# Patient Record
Sex: FEMALE | Race: WHITE | Employment: OTHER | URBAN - METROPOLITAN AREA
[De-identification: names, ages, dates, MRNs, and addresses within clinical notes are randomized per-mention and may not be internally consistent; named-entity substitution may affect disease eponyms.]

---

## 2022-05-30 ENCOUNTER — APPOINTMENT (OUTPATIENT)
Dept: CT IMAGING | Age: 87
DRG: 480 | End: 2022-05-30
Payer: MEDICARE

## 2022-05-30 ENCOUNTER — APPOINTMENT (OUTPATIENT)
Dept: GENERAL RADIOLOGY | Age: 87
DRG: 480 | End: 2022-05-30
Payer: MEDICARE

## 2022-05-30 ENCOUNTER — HOSPITAL ENCOUNTER (INPATIENT)
Age: 87
LOS: 4 days | Discharge: HOME HEALTH | DRG: 480 | End: 2022-06-03
Attending: EMERGENCY MEDICINE
Payer: MEDICARE

## 2022-05-30 DIAGNOSIS — S72.002A CLOSED FRACTURE OF LEFT HIP, INITIAL ENCOUNTER (HCC): Primary | ICD-10-CM

## 2022-05-30 DIAGNOSIS — R91.8 PULMONARY INFILTRATE: ICD-10-CM

## 2022-05-30 PROBLEM — E78.5 HYPERLIPIDEMIA: Chronic | Status: ACTIVE | Noted: 2022-05-30

## 2022-05-30 PROBLEM — E11.9 TYPE 2 DIABETES MELLITUS (HCC): Chronic | Status: ACTIVE | Noted: 2022-05-30

## 2022-05-30 PROBLEM — J84.10 PULMONARY FIBROSIS (HCC): Chronic | Status: ACTIVE | Noted: 2022-05-30

## 2022-05-30 PROBLEM — D72.829 LEUKOCYTOSIS: Status: ACTIVE | Noted: 2022-05-30

## 2022-05-30 PROBLEM — M54.9 CHRONIC BACK PAIN: Chronic | Status: ACTIVE | Noted: 2022-05-30

## 2022-05-30 PROBLEM — I10 HYPERTENSION: Chronic | Status: ACTIVE | Noted: 2022-05-30

## 2022-05-30 PROBLEM — G89.29 CHRONIC BACK PAIN: Chronic | Status: ACTIVE | Noted: 2022-05-30

## 2022-05-30 PROBLEM — S72.22XA CLOSED LEFT SUBTROCHANTERIC FEMUR FRACTURE, INITIAL ENCOUNTER (HCC): Status: ACTIVE | Noted: 2022-05-30

## 2022-05-30 PROBLEM — J96.11 CHRONIC RESPIRATORY FAILURE WITH HYPOXIA (HCC): Chronic | Status: ACTIVE | Noted: 2022-05-30

## 2022-05-30 PROBLEM — F11.20 OPIOID DEPENDENCE (HCC): Chronic | Status: ACTIVE | Noted: 2022-05-30

## 2022-05-30 LAB
ALBUMIN SERPL-MCNC: 3.1 G/DL (ref 3.2–4.6)
ALBUMIN/GLOB SERPL: 0.8 {RATIO} (ref 1.2–3.5)
ALP SERPL-CCNC: 90 U/L (ref 50–136)
ALT SERPL-CCNC: 17 U/L (ref 12–65)
ANION GAP SERPL CALC-SCNC: 3 MMOL/L (ref 7–16)
APPEARANCE UR: CLEAR
AST SERPL-CCNC: 18 U/L (ref 15–37)
BASOPHILS # BLD: 0 K/UL (ref 0–0.2)
BASOPHILS NFR BLD: 0 % (ref 0–2)
BILIRUB SERPL-MCNC: 0.3 MG/DL (ref 0.2–1.1)
BILIRUB UR QL: NEGATIVE
BUN SERPL-MCNC: 14 MG/DL (ref 8–23)
CALCIUM SERPL-MCNC: 9.2 MG/DL (ref 8.3–10.4)
CHLORIDE SERPL-SCNC: 102 MMOL/L (ref 98–107)
CO2 SERPL-SCNC: 33 MMOL/L (ref 21–32)
COLOR UR: YELLOW
CREAT SERPL-MCNC: 1 MG/DL (ref 0.6–1)
DIFFERENTIAL METHOD BLD: ABNORMAL
EOSINOPHIL # BLD: 0.2 K/UL (ref 0–0.8)
EOSINOPHIL NFR BLD: 1 % (ref 0.5–7.8)
ERYTHROCYTE [DISTWIDTH] IN BLOOD BY AUTOMATED COUNT: 12.4 % (ref 11.9–14.6)
GLOBULIN SER CALC-MCNC: 4.1 G/DL (ref 2.3–3.5)
GLUCOSE BLD STRIP.AUTO-MCNC: 165 MG/DL (ref 65–100)
GLUCOSE SERPL-MCNC: 186 MG/DL (ref 65–100)
GLUCOSE UR STRIP.AUTO-MCNC: NEGATIVE MG/DL
HCT VFR BLD AUTO: 34.6 % (ref 35.8–46.3)
HGB BLD-MCNC: 11.1 G/DL (ref 11.7–15.4)
HGB UR QL STRIP: NEGATIVE
IMM GRANULOCYTES # BLD AUTO: 0.1 K/UL (ref 0–0.5)
IMM GRANULOCYTES NFR BLD AUTO: 0 % (ref 0–5)
KETONES UR QL STRIP.AUTO: NEGATIVE MG/DL
LEUKOCYTE ESTERASE UR QL STRIP.AUTO: NEGATIVE
LYMPHOCYTES # BLD: 1.2 K/UL (ref 0.5–4.6)
LYMPHOCYTES NFR BLD: 8 % (ref 13–44)
MCH RBC QN AUTO: 29.1 PG (ref 26.1–32.9)
MCHC RBC AUTO-ENTMCNC: 32.1 G/DL (ref 31.4–35)
MCV RBC AUTO: 90.8 FL (ref 79.6–97.8)
MONOCYTES # BLD: 0.7 K/UL (ref 0.1–1.3)
MONOCYTES NFR BLD: 5 % (ref 4–12)
NEUTS SEG # BLD: 11.9 K/UL (ref 1.7–8.2)
NEUTS SEG NFR BLD: 86 % (ref 43–78)
NITRITE UR QL STRIP.AUTO: NEGATIVE
NRBC # BLD: 0 K/UL (ref 0–0.2)
PH UR STRIP: 6.5 [PH] (ref 5–9)
PLATELET # BLD AUTO: 285 K/UL (ref 150–450)
PMV BLD AUTO: 11.4 FL (ref 9.4–12.3)
POTASSIUM SERPL-SCNC: 4.3 MMOL/L (ref 3.5–5.1)
PROT SERPL-MCNC: 7.2 G/DL (ref 6.3–8.2)
PROT UR STRIP-MCNC: NEGATIVE MG/DL
RBC # BLD AUTO: 3.81 M/UL (ref 4.05–5.2)
SERVICE CMNT-IMP: ABNORMAL
SODIUM SERPL-SCNC: 138 MMOL/L (ref 136–145)
SP GR UR REFRACTOMETRY: 1.01 (ref 1–1.02)
UROBILINOGEN UR QL STRIP.AUTO: 0.2 EU/DL (ref 0.2–1)
WBC # BLD AUTO: 14 K/UL (ref 4.3–11.1)

## 2022-05-30 PROCEDURE — 96374 THER/PROPH/DIAG INJ IV PUSH: CPT

## 2022-05-30 PROCEDURE — 73552 X-RAY EXAM OF FEMUR 2/>: CPT

## 2022-05-30 PROCEDURE — 99285 EMERGENCY DEPT VISIT HI MDM: CPT

## 2022-05-30 PROCEDURE — 81003 URINALYSIS AUTO W/O SCOPE: CPT

## 2022-05-30 PROCEDURE — 2700000000 HC OXYGEN THERAPY PER DAY

## 2022-05-30 PROCEDURE — 72100 X-RAY EXAM L-S SPINE 2/3 VWS: CPT

## 2022-05-30 PROCEDURE — 82962 GLUCOSE BLOOD TEST: CPT

## 2022-05-30 PROCEDURE — 73502 X-RAY EXAM HIP UNI 2-3 VIEWS: CPT

## 2022-05-30 PROCEDURE — 94760 N-INVAS EAR/PLS OXIMETRY 1: CPT

## 2022-05-30 PROCEDURE — 1100000000 HC RM PRIVATE

## 2022-05-30 PROCEDURE — 93005 ELECTROCARDIOGRAM TRACING: CPT | Performed by: EMERGENCY MEDICINE

## 2022-05-30 PROCEDURE — 2580000003 HC RX 258: Performed by: EMERGENCY MEDICINE

## 2022-05-30 PROCEDURE — 6360000002 HC RX W HCPCS: Performed by: EMERGENCY MEDICINE

## 2022-05-30 PROCEDURE — 73700 CT LOWER EXTREMITY W/O DYE: CPT

## 2022-05-30 PROCEDURE — 70450 CT HEAD/BRAIN W/O DYE: CPT

## 2022-05-30 PROCEDURE — 71045 X-RAY EXAM CHEST 1 VIEW: CPT

## 2022-05-30 PROCEDURE — 85025 COMPLETE CBC W/AUTO DIFF WBC: CPT

## 2022-05-30 PROCEDURE — 96375 TX/PRO/DX INJ NEW DRUG ADDON: CPT

## 2022-05-30 PROCEDURE — 2580000003 HC RX 258: Performed by: INTERNAL MEDICINE

## 2022-05-30 PROCEDURE — 80053 COMPREHEN METABOLIC PANEL: CPT

## 2022-05-30 PROCEDURE — 72125 CT NECK SPINE W/O DYE: CPT

## 2022-05-30 PROCEDURE — 6370000000 HC RX 637 (ALT 250 FOR IP): Performed by: INTERNAL MEDICINE

## 2022-05-30 PROCEDURE — 6360000002 HC RX W HCPCS: Performed by: INTERNAL MEDICINE

## 2022-05-30 RX ORDER — FERROUS SULFATE 325(65) MG
325 TABLET ORAL
Status: DISCONTINUED | OUTPATIENT
Start: 2022-05-31 | End: 2022-06-03 | Stop reason: HOSPADM

## 2022-05-30 RX ORDER — CARVEDILOL 12.5 MG/1
12.5 TABLET ORAL 2 TIMES DAILY WITH MEALS
Status: DISCONTINUED | OUTPATIENT
Start: 2022-05-30 | End: 2022-06-03 | Stop reason: HOSPADM

## 2022-05-30 RX ORDER — HYDROMORPHONE HYDROCHLORIDE 1 MG/ML
0.5 INJECTION, SOLUTION INTRAMUSCULAR; INTRAVENOUS; SUBCUTANEOUS EVERY 4 HOURS PRN
Status: DISCONTINUED | OUTPATIENT
Start: 2022-05-30 | End: 2022-05-30

## 2022-05-30 RX ORDER — INSULIN LISPRO 100 [IU]/ML
0-4 INJECTION, SOLUTION INTRAVENOUS; SUBCUTANEOUS
Status: DISCONTINUED | OUTPATIENT
Start: 2022-05-31 | End: 2022-06-03 | Stop reason: HOSPADM

## 2022-05-30 RX ORDER — ONDANSETRON 2 MG/ML
4 INJECTION INTRAMUSCULAR; INTRAVENOUS EVERY 6 HOURS PRN
Status: DISCONTINUED | OUTPATIENT
Start: 2022-05-30 | End: 2022-06-01

## 2022-05-30 RX ORDER — SODIUM CHLORIDE 0.9 % (FLUSH) 0.9 %
5-40 SYRINGE (ML) INJECTION EVERY 12 HOURS SCHEDULED
Status: DISCONTINUED | OUTPATIENT
Start: 2022-05-30 | End: 2022-06-01

## 2022-05-30 RX ORDER — MORPHINE SULFATE 4 MG/ML
4 INJECTION INTRAVENOUS
Status: COMPLETED | OUTPATIENT
Start: 2022-05-30 | End: 2022-05-30

## 2022-05-30 RX ORDER — TRAZODONE HYDROCHLORIDE 50 MG/1
150 TABLET ORAL NIGHTLY
Status: DISCONTINUED | OUTPATIENT
Start: 2022-05-30 | End: 2022-06-03 | Stop reason: HOSPADM

## 2022-05-30 RX ORDER — ALBUTEROL SULFATE 2.5 MG/3ML
2.5 SOLUTION RESPIRATORY (INHALATION) EVERY 6 HOURS PRN
Status: DISCONTINUED | OUTPATIENT
Start: 2022-05-30 | End: 2022-06-03 | Stop reason: HOSPADM

## 2022-05-30 RX ORDER — HYDROMORPHONE HYDROCHLORIDE 1 MG/ML
0.5 INJECTION, SOLUTION INTRAMUSCULAR; INTRAVENOUS; SUBCUTANEOUS EVERY 4 HOURS PRN
Status: DISCONTINUED | OUTPATIENT
Start: 2022-05-30 | End: 2022-05-31

## 2022-05-30 RX ORDER — ATORVASTATIN CALCIUM 10 MG/1
20 TABLET, FILM COATED ORAL NIGHTLY
Status: DISCONTINUED | OUTPATIENT
Start: 2022-05-30 | End: 2022-06-03 | Stop reason: HOSPADM

## 2022-05-30 RX ORDER — ONDANSETRON 2 MG/ML
4 INJECTION INTRAMUSCULAR; INTRAVENOUS
Status: COMPLETED | OUTPATIENT
Start: 2022-05-30 | End: 2022-05-30

## 2022-05-30 RX ORDER — OXYCODONE HYDROCHLORIDE 5 MG/1
5 TABLET ORAL EVERY 4 HOURS PRN
Status: DISCONTINUED | OUTPATIENT
Start: 2022-05-30 | End: 2022-06-01

## 2022-05-30 RX ORDER — GABAPENTIN 100 MG/1
100 CAPSULE ORAL 3 TIMES DAILY
Status: DISCONTINUED | OUTPATIENT
Start: 2022-05-30 | End: 2022-06-03 | Stop reason: HOSPADM

## 2022-05-30 RX ORDER — INSULIN LISPRO 100 [IU]/ML
0-4 INJECTION, SOLUTION INTRAVENOUS; SUBCUTANEOUS NIGHTLY
Status: DISCONTINUED | OUTPATIENT
Start: 2022-05-30 | End: 2022-06-03 | Stop reason: HOSPADM

## 2022-05-30 RX ORDER — ONDANSETRON 4 MG/1
4 TABLET, ORALLY DISINTEGRATING ORAL EVERY 8 HOURS PRN
Status: DISCONTINUED | OUTPATIENT
Start: 2022-05-30 | End: 2022-06-01

## 2022-05-30 RX ORDER — PANTOPRAZOLE SODIUM 40 MG/1
40 TABLET, DELAYED RELEASE ORAL
Status: DISCONTINUED | OUTPATIENT
Start: 2022-05-31 | End: 2022-06-03 | Stop reason: HOSPADM

## 2022-05-30 RX ORDER — SODIUM CHLORIDE 0.9 % (FLUSH) 0.9 %
5-40 SYRINGE (ML) INJECTION PRN
Status: DISCONTINUED | OUTPATIENT
Start: 2022-05-30 | End: 2022-06-01

## 2022-05-30 RX ORDER — HYDRALAZINE HYDROCHLORIDE 20 MG/ML
10 INJECTION INTRAMUSCULAR; INTRAVENOUS EVERY 6 HOURS PRN
Status: DISCONTINUED | OUTPATIENT
Start: 2022-05-30 | End: 2022-06-03 | Stop reason: HOSPADM

## 2022-05-30 RX ORDER — FUROSEMIDE 40 MG/1
40 TABLET ORAL 2 TIMES DAILY
Status: DISCONTINUED | OUTPATIENT
Start: 2022-05-30 | End: 2022-06-03 | Stop reason: HOSPADM

## 2022-05-30 RX ORDER — ACETAMINOPHEN 325 MG/1
650 TABLET ORAL EVERY 6 HOURS PRN
Status: DISCONTINUED | OUTPATIENT
Start: 2022-05-30 | End: 2022-06-03 | Stop reason: HOSPADM

## 2022-05-30 RX ORDER — TOPIRAMATE 25 MG/1
25 TABLET ORAL 2 TIMES DAILY
Status: DISCONTINUED | OUTPATIENT
Start: 2022-05-30 | End: 2022-06-03 | Stop reason: HOSPADM

## 2022-05-30 RX ORDER — SODIUM CHLORIDE 9 MG/ML
INJECTION, SOLUTION INTRAVENOUS PRN
Status: DISCONTINUED | OUTPATIENT
Start: 2022-05-30 | End: 2022-06-01

## 2022-05-30 RX ORDER — PIRFENIDONE 267 MG/1
801 TABLET, FILM COATED ORAL 3 TIMES DAILY
Status: DISCONTINUED | OUTPATIENT
Start: 2022-05-31 | End: 2022-06-03 | Stop reason: HOSPADM

## 2022-05-30 RX ORDER — 0.9 % SODIUM CHLORIDE 0.9 %
500 INTRAVENOUS SOLUTION INTRAVENOUS
Status: COMPLETED | OUTPATIENT
Start: 2022-05-30 | End: 2022-05-30

## 2022-05-30 RX ORDER — POLYETHYLENE GLYCOL 3350 17 G/17G
17 POWDER, FOR SOLUTION ORAL DAILY PRN
Status: DISCONTINUED | OUTPATIENT
Start: 2022-05-30 | End: 2022-06-03 | Stop reason: HOSPADM

## 2022-05-30 RX ORDER — LOSARTAN POTASSIUM 50 MG/1
50 TABLET ORAL DAILY
Status: DISCONTINUED | OUTPATIENT
Start: 2022-05-31 | End: 2022-06-03 | Stop reason: HOSPADM

## 2022-05-30 RX ORDER — DIAZEPAM 2 MG/1
2 TABLET ORAL EVERY 12 HOURS PRN
Status: DISCONTINUED | OUTPATIENT
Start: 2022-05-30 | End: 2022-06-03 | Stop reason: HOSPADM

## 2022-05-30 RX ORDER — ASPIRIN 81 MG/1
81 TABLET, CHEWABLE ORAL DAILY
Status: DISCONTINUED | OUTPATIENT
Start: 2022-05-31 | End: 2022-06-01 | Stop reason: SDUPTHER

## 2022-05-30 RX ADMIN — FUROSEMIDE 40 MG: 40 TABLET ORAL at 21:52

## 2022-05-30 RX ADMIN — AZITHROMYCIN MONOHYDRATE 500 MG: 500 INJECTION, POWDER, LYOPHILIZED, FOR SOLUTION INTRAVENOUS at 21:46

## 2022-05-30 RX ADMIN — GABAPENTIN 100 MG: 100 CAPSULE ORAL at 21:52

## 2022-05-30 RX ADMIN — ATORVASTATIN CALCIUM 20 MG: 10 TABLET, FILM COATED ORAL at 21:51

## 2022-05-30 RX ADMIN — TOPIRAMATE 25 MG: 25 TABLET, FILM COATED ORAL at 21:52

## 2022-05-30 RX ADMIN — SODIUM CHLORIDE, PRESERVATIVE FREE 10 ML: 5 INJECTION INTRAVENOUS at 21:08

## 2022-05-30 RX ADMIN — CARVEDILOL 12.5 MG: 12.5 TABLET, FILM COATED ORAL at 21:52

## 2022-05-30 RX ADMIN — CEFTRIAXONE 2000 MG: 2 INJECTION, POWDER, FOR SOLUTION INTRAMUSCULAR; INTRAVENOUS at 21:13

## 2022-05-30 RX ADMIN — MORPHINE SULFATE 4 MG: 4 INJECTION INTRAVENOUS at 16:14

## 2022-05-30 RX ADMIN — TRAZODONE HYDROCHLORIDE 150 MG: 50 TABLET ORAL at 21:52

## 2022-05-30 RX ADMIN — ONDANSETRON 4 MG: 2 INJECTION INTRAMUSCULAR; INTRAVENOUS at 16:14

## 2022-05-30 RX ADMIN — HYDROMORPHONE HYDROCHLORIDE 0.5 MG: 1 INJECTION, SOLUTION INTRAMUSCULAR; INTRAVENOUS; SUBCUTANEOUS at 21:07

## 2022-05-30 RX ADMIN — SODIUM CHLORIDE 500 ML: 9 INJECTION, SOLUTION INTRAVENOUS at 16:14

## 2022-05-30 ASSESSMENT — PAIN DESCRIPTION - ORIENTATION
ORIENTATION: LEFT
ORIENTATION: LOWER

## 2022-05-30 ASSESSMENT — ENCOUNTER SYMPTOMS
ABDOMINAL PAIN: 0
BACK PAIN: 0
VOMITING: 0
SHORTNESS OF BREATH: 0
COUGH: 0

## 2022-05-30 ASSESSMENT — PAIN SCALES - GENERAL
PAINLEVEL_OUTOF10: 7
PAINLEVEL_OUTOF10: 6
PAINLEVEL_OUTOF10: 10
PAINLEVEL_OUTOF10: 10

## 2022-05-30 ASSESSMENT — PAIN - FUNCTIONAL ASSESSMENT
PAIN_FUNCTIONAL_ASSESSMENT: INTOLERABLE, UNABLE TO DO ANY ACTIVE OR PASSIVE ACTIVITIES
PAIN_FUNCTIONAL_ASSESSMENT: 0-10

## 2022-05-30 ASSESSMENT — PAIN SCALES - WONG BAKER: WONGBAKER_NUMERICALRESPONSE: 0

## 2022-05-30 ASSESSMENT — PAIN DESCRIPTION - LOCATION
LOCATION: BACK;HIP
LOCATION: HIP
LOCATION: BACK

## 2022-05-30 ASSESSMENT — PAIN DESCRIPTION - DESCRIPTORS
DESCRIPTORS: ACHING
DESCRIPTORS: ACHING

## 2022-05-30 NOTE — ED TRIAGE NOTES
Patient arrives to ED via EMS from home. Patient reports falling last night. Patient reports she does not know what caused her to fall. Patient reports, \"I just fell\". Denies hitting head. Denies LOC. Patient reports falling onto her left side. Patient hit left elbow and left hip. Patient also complains of lower back pain. Denies blood thinners. Patient wears 4L of oxygen all the time. Patient has history of Cystic Fibrosis.      VS:  BP-116/67  HR-91  BGL-130  O2-95% on 4L

## 2022-05-30 NOTE — ED PROVIDER NOTES
[employer] Emergency Department Provider Note                   PCP:                Sandy Saldivar MD               Age: 81 y.o. Sex: female     No diagnosis found. DISPOSITION         New Prescriptions    No medications on file       Orders Placed This Encounter   Procedures    CT HEAD WO CONTRAST    CT CERVICAL SPINE WO CONTRAST    XR LUMBAR SPINE (2-3 VIEWS)    XR HIP 2-3 VW W PELVIS LEFT    XR FEMUR LEFT (MIN 2 VIEWS)    XR CHEST 1 VIEW    CT HIP LEFT WO CONTRAST    CBC with Auto Differential    Comprehensive Metabolic Panel    Urinalysis    Insert carreno catheter    EKG 12 Lead        MDM  Number of Diagnoses or Management Options  Closed fracture of left hip, initial encounter ([facility] [address]  Pulmonary infiltrate  Diagnosis management comments: Blood work shows mild leukocytosis 14.0, hemoglobin 11.1 hematocrit 34.6. Rest of labs unremarkable. X-ray of L-spine, left hip and femur no acute fracture. CT head and C-spine negative for acute abnormality. Patient's exam concerning for hip fracture therefore CT scan was obtained following the negative x-ray reading. This confirms proximal femoral neck fracture. Patient has been treated with morphine and Zofran. Hospitalist consulted for admission. Amount and/or Complexity of Data Reviewed  Clinical lab tests: ordered and reviewed  Tests in the radiology section of CPT®: ordered and reviewed  Tests in the medicine section of CPT®: reviewed and ordered  Independent visualization of images, tracings, or specimens: yes    Risk of Complications, Morbidity, and/or Mortality  Presenting problems: moderate  Diagnostic procedures: moderate  Management options: moderate         Leyda July is a 81 y.o. female who presents to the Emergency Department with chief complaint of    Chief Complaint   Patient presents with    Fall      45-year-old white female presents after falling during the night last night. She fell onto her left side. No loss of consciousness.   No neck pain or back pain. She is only complaining of pain to her left hip and elbow.  was able to assist her back to bed but this morning she is unable to put any weight on her left leg. The history is provided by the patient and the spouse. All other systems reviewed and are negative. Review of Systems   Constitutional:  Negative for fever. HENT:  Negative for congestion. Respiratory:  Negative for cough and shortness of breath. Cardiovascular:  Negative for chest pain. Gastrointestinal:  Negative for abdominal pain and vomiting. Genitourinary:  Negative for dysuria. Musculoskeletal:  Negative for back pain and neck pain. Skin:  Negative for rash. Neurological:  Negative for headaches. All other systems reviewed and are negative. No past medical history on file. No past surgical history on file. No family history on file. Social Connections:     Frequency of Communication with Friends and Family: Not on file    Frequency of Social Gatherings with Friends and Family: Not on file    Attends Hoahaoism Services: Not on file    Active Member of Clubs or Organizations: Not on file    Attends Club or Organization Meetings: Not on file    Marital Status: Not on file        Allergies   Allergen Reactions    Carbamazepine     Metronidazole Nausea Only     Other reaction(s): Nausea    Prednisone      Other reaction(s): Rash    Tetanus Toxoid      Other reaction(s): Rash        Vitals signs and nursing note reviewed. Patient Vitals for the past 4 hrs:   Temp Pulse Resp BP SpO2   05/30/22 1500 99.5 °F (37.5 °C) 91 16 (!) 150/70 95 %          Physical Exam  Vitals and nursing note reviewed. Constitutional:       General: She is not in acute distress. Appearance: Normal appearance. She is not toxic-appearing. HENT:      Head: Normocephalic and atraumatic. Nose: Nose normal.      Mouth/Throat:      Mouth: Mucous membranes are dry.    Eyes:      Pupils: Pupils are equal, round, and reactive to light. Cardiovascular:      Rate and Rhythm: Normal rate and regular rhythm. Pulmonary:      Effort: Pulmonary effort is normal.      Breath sounds: Normal breath sounds. Abdominal:      General: There is no distension. Tenderness: There is no abdominal tenderness. There is no guarding. Musculoskeletal:      Cervical back: Normal range of motion. No tenderness. Comments: Patient has no range of motion to the left hip due to severe pain. No shortening or abnormal rotation. Good distal sensation. Skin:     General: Skin is warm and dry. Neurological:      Mental Status: She is alert and oriented to person, place, and time.    Psychiatric:         Mood and Affect: Mood normal.         Behavior: Behavior normal.        EKG 12 Lead    Date/Time: 10/13/2022 11:18 PM  Performed by: Christopher Smith MD  Authorized by: Christopher Smith MD     ECG reviewed by ED Physician in the absence of a cardiologist: yes    Interpretation:     Interpretation: abnormal    Rate:     ECG rate:  90    ECG rate assessment: normal    Rhythm:     Rhythm: sinus rhythm    Ectopy:     Ectopy: none    QRS:     QRS axis:  Normal  ST segments:     ST segments:  Normal  T waves:     T waves: non-specific    Other findings:     Other findings: prolonged qTc interval      Labs Reviewed   CBC WITH AUTO DIFFERENTIAL - Abnormal; Notable for the following components:       Result Value    WBC 14.0 (*)     RBC 3.81 (*)     Hemoglobin 11.1 (*)     Hematocrit 34.6 (*)     Seg Neutrophils 86 (*)     Lymphocytes 8 (*)     Segs Absolute 11.9 (*)     All other components within normal limits   COMPREHENSIVE METABOLIC PANEL - Abnormal; Notable for the following components:    CO2 33 (*)     Anion Gap 3 (*)     Glucose 186 (*)     GFR Non- 56 (*)     Albumin 3.1 (*)     Globulin 4.1 (*)     Albumin/Globulin Ratio 0.8 (*)     All other components within normal limits   URINALYSIS        XR LUMBAR SPINE (2-3 VIEWS)   Final Result   No acute fracture. Scoliosis, degenerative changes, low bone density noted. XR HIP 2-3 VW W PELVIS LEFT   Final Result      1. No evidence of a displaced fracture or dislocation. 2. Low bone density, and left hip rotation limiting some detail. Cannot exclude   nondisplaced proximal femur fracture. XR FEMUR LEFT (MIN 2 VIEWS)   Final Result      1. No evidence of a displaced fracture or dislocation. 2. Low bone density, and left hip rotation limiting some detail. Cannot exclude   nondisplaced proximal femur fracture. XR CHEST 1 VIEW   Final Result   1. Left basilar pneumonia. 2. Cardiac enlargement and pulmonary edema suggested. CT HEAD WO CONTRAST   Final Result   No CT evidence of acute intracranial abnormality. CT CERVICAL SPINE WO CONTRAST   Final Result   No acute osseous abnormality. CT HIP LEFT WO CONTRAST    (Results Pending)            [place] Coma Scale  Eye Opening: Spontaneous  Best Verbal Response: Confused  Best Motor Response: Obeys commands  [place] Coma Scale Score: 14                     Voice dictation software was used during the making of this note. This software is not perfect and grammatical and other typographical errors may be present. This note has not been completely proofread for errors.       Pattie Naylor MD  05/30/22 1975 Eusebio Naylor MD  10/13/22 [id number]       Pattie Naylor MD  10/13/22 [id number]

## 2022-05-31 ENCOUNTER — ANESTHESIA EVENT (OUTPATIENT)
Dept: SURGERY | Age: 87
DRG: 480 | End: 2022-05-31
Payer: MEDICARE

## 2022-05-31 PROBLEM — S72.002A CLOSED LEFT HIP FRACTURE (HCC): Status: ACTIVE | Noted: 2022-05-30

## 2022-05-31 LAB
ANION GAP SERPL CALC-SCNC: 4 MMOL/L (ref 7–16)
BASOPHILS # BLD: 0 K/UL (ref 0–0.2)
BASOPHILS NFR BLD: 0 % (ref 0–2)
BUN SERPL-MCNC: 15 MG/DL (ref 8–23)
CALCIUM SERPL-MCNC: 9 MG/DL (ref 8.3–10.4)
CHLORIDE SERPL-SCNC: 103 MMOL/L (ref 98–107)
CO2 SERPL-SCNC: 33 MMOL/L (ref 21–32)
CREAT SERPL-MCNC: 0.9 MG/DL (ref 0.6–1)
DIFFERENTIAL METHOD BLD: ABNORMAL
EOSINOPHIL # BLD: 0.2 K/UL (ref 0–0.8)
EOSINOPHIL NFR BLD: 2 % (ref 0.5–7.8)
ERYTHROCYTE [DISTWIDTH] IN BLOOD BY AUTOMATED COUNT: 12.6 % (ref 11.9–14.6)
GLUCOSE BLD STRIP.AUTO-MCNC: 172 MG/DL (ref 65–100)
GLUCOSE BLD STRIP.AUTO-MCNC: 178 MG/DL (ref 65–100)
GLUCOSE BLD STRIP.AUTO-MCNC: 188 MG/DL (ref 65–100)
GLUCOSE BLD STRIP.AUTO-MCNC: 193 MG/DL (ref 65–100)
GLUCOSE SERPL-MCNC: 168 MG/DL (ref 65–100)
HCT VFR BLD AUTO: 31.5 % (ref 35.8–46.3)
HGB BLD-MCNC: 10.1 G/DL (ref 11.7–15.4)
IMM GRANULOCYTES # BLD AUTO: 0.1 K/UL (ref 0–0.5)
IMM GRANULOCYTES NFR BLD AUTO: 1 % (ref 0–5)
LYMPHOCYTES # BLD: 1 K/UL (ref 0.5–4.6)
LYMPHOCYTES NFR BLD: 10 % (ref 13–44)
MCH RBC QN AUTO: 29.2 PG (ref 26.1–32.9)
MCHC RBC AUTO-ENTMCNC: 32.1 G/DL (ref 31.4–35)
MCV RBC AUTO: 91 FL (ref 79.6–97.8)
MONOCYTES # BLD: 0.6 K/UL (ref 0.1–1.3)
MONOCYTES NFR BLD: 6 % (ref 4–12)
NEUTS SEG # BLD: 8.5 K/UL (ref 1.7–8.2)
NEUTS SEG NFR BLD: 81 % (ref 43–78)
NRBC # BLD: 0 K/UL (ref 0–0.2)
PLATELET # BLD AUTO: 237 K/UL (ref 150–450)
PMV BLD AUTO: 11.2 FL (ref 9.4–12.3)
POTASSIUM SERPL-SCNC: 3.6 MMOL/L (ref 3.5–5.1)
RBC # BLD AUTO: 3.46 M/UL (ref 4.05–5.2)
SERVICE CMNT-IMP: ABNORMAL
SODIUM SERPL-SCNC: 140 MMOL/L (ref 136–145)
WBC # BLD AUTO: 10.4 K/UL (ref 4.3–11.1)

## 2022-05-31 PROCEDURE — 92610 EVALUATE SWALLOWING FUNCTION: CPT

## 2022-05-31 PROCEDURE — 6370000000 HC RX 637 (ALT 250 FOR IP): Performed by: INTERNAL MEDICINE

## 2022-05-31 PROCEDURE — 6360000002 HC RX W HCPCS: Performed by: NURSE PRACTITIONER

## 2022-05-31 PROCEDURE — 36415 COLL VENOUS BLD VENIPUNCTURE: CPT

## 2022-05-31 PROCEDURE — 1100000000 HC RM PRIVATE

## 2022-05-31 PROCEDURE — 94760 N-INVAS EAR/PLS OXIMETRY 1: CPT

## 2022-05-31 PROCEDURE — 2580000003 HC RX 258: Performed by: INTERNAL MEDICINE

## 2022-05-31 PROCEDURE — 94664 DEMO&/EVAL PT USE INHALER: CPT

## 2022-05-31 PROCEDURE — 80048 BASIC METABOLIC PNL TOTAL CA: CPT

## 2022-05-31 PROCEDURE — 94640 AIRWAY INHALATION TREATMENT: CPT

## 2022-05-31 PROCEDURE — 85025 COMPLETE CBC W/AUTO DIFF WBC: CPT

## 2022-05-31 PROCEDURE — 2500000003 HC RX 250 WO HCPCS: Performed by: INTERNAL MEDICINE

## 2022-05-31 PROCEDURE — 6360000002 HC RX W HCPCS: Performed by: INTERNAL MEDICINE

## 2022-05-31 PROCEDURE — 2700000000 HC OXYGEN THERAPY PER DAY

## 2022-05-31 PROCEDURE — 82962 GLUCOSE BLOOD TEST: CPT

## 2022-05-31 RX ORDER — HYDROMORPHONE HYDROCHLORIDE 1 MG/ML
0.5 INJECTION, SOLUTION INTRAMUSCULAR; INTRAVENOUS; SUBCUTANEOUS ONCE
Status: COMPLETED | OUTPATIENT
Start: 2022-05-31 | End: 2022-05-31

## 2022-05-31 RX ORDER — HYDROMORPHONE HYDROCHLORIDE 1 MG/ML
1 INJECTION, SOLUTION INTRAMUSCULAR; INTRAVENOUS; SUBCUTANEOUS EVERY 4 HOURS PRN
Status: DISCONTINUED | OUTPATIENT
Start: 2022-05-31 | End: 2022-06-01

## 2022-05-31 RX ADMIN — GABAPENTIN 100 MG: 100 CAPSULE ORAL at 15:40

## 2022-05-31 RX ADMIN — SODIUM CHLORIDE, PRESERVATIVE FREE 10 ML: 5 INJECTION INTRAVENOUS at 20:57

## 2022-05-31 RX ADMIN — ATORVASTATIN CALCIUM 20 MG: 10 TABLET, FILM COATED ORAL at 20:57

## 2022-05-31 RX ADMIN — AZITHROMYCIN MONOHYDRATE 500 MG: 500 INJECTION, POWDER, LYOPHILIZED, FOR SOLUTION INTRAVENOUS at 21:32

## 2022-05-31 RX ADMIN — TIOTROPIUM BROMIDE INHALATION SPRAY 2 PUFF: 3.12 SPRAY, METERED RESPIRATORY (INHALATION) at 09:30

## 2022-05-31 RX ADMIN — GABAPENTIN 100 MG: 100 CAPSULE ORAL at 20:57

## 2022-05-31 RX ADMIN — CEFTRIAXONE 2000 MG: 2 INJECTION, POWDER, FOR SOLUTION INTRAMUSCULAR; INTRAVENOUS at 20:57

## 2022-05-31 RX ADMIN — CARVEDILOL 12.5 MG: 12.5 TABLET, FILM COATED ORAL at 17:39

## 2022-05-31 RX ADMIN — TOPIRAMATE 25 MG: 25 TABLET, FILM COATED ORAL at 09:37

## 2022-05-31 RX ADMIN — OXYCODONE 5 MG: 5 TABLET ORAL at 20:57

## 2022-05-31 RX ADMIN — TUBERCULIN PURIFIED PROTEIN DERIVATIVE 5 UNITS: 5 INJECTION, SOLUTION INTRADERMAL at 17:37

## 2022-05-31 RX ADMIN — HYDROMORPHONE HYDROCHLORIDE 0.5 MG: 1 INJECTION, SOLUTION INTRAMUSCULAR; INTRAVENOUS; SUBCUTANEOUS at 10:24

## 2022-05-31 RX ADMIN — HYDROMORPHONE HYDROCHLORIDE 0.5 MG: 1 INJECTION, SOLUTION INTRAMUSCULAR; INTRAVENOUS; SUBCUTANEOUS at 07:03

## 2022-05-31 RX ADMIN — ONDANSETRON 4 MG: 2 INJECTION INTRAMUSCULAR; INTRAVENOUS at 05:31

## 2022-05-31 RX ADMIN — CARVEDILOL 12.5 MG: 12.5 TABLET, FILM COATED ORAL at 09:37

## 2022-05-31 RX ADMIN — LOSARTAN POTASSIUM 50 MG: 50 TABLET, FILM COATED ORAL at 09:37

## 2022-05-31 RX ADMIN — GABAPENTIN 100 MG: 100 CAPSULE ORAL at 09:37

## 2022-05-31 RX ADMIN — OXYCODONE 5 MG: 5 TABLET ORAL at 15:21

## 2022-05-31 RX ADMIN — SODIUM CHLORIDE, PRESERVATIVE FREE 10 ML: 5 INJECTION INTRAVENOUS at 09:39

## 2022-05-31 RX ADMIN — HYDROMORPHONE HYDROCHLORIDE 1 MG: 1 INJECTION, SOLUTION INTRAMUSCULAR; INTRAVENOUS; SUBCUTANEOUS at 17:40

## 2022-05-31 RX ADMIN — FUROSEMIDE 40 MG: 40 TABLET ORAL at 09:38

## 2022-05-31 RX ADMIN — TOPIRAMATE 25 MG: 25 TABLET, FILM COATED ORAL at 20:57

## 2022-05-31 RX ADMIN — FUROSEMIDE 40 MG: 40 TABLET ORAL at 17:39

## 2022-05-31 RX ADMIN — TRAZODONE HYDROCHLORIDE 150 MG: 50 TABLET ORAL at 20:57

## 2022-05-31 ASSESSMENT — PAIN DESCRIPTION - LOCATION
LOCATION: BACK
LOCATION: BACK;NECK;HIP
LOCATION: BACK;HIP
LOCATION: BACK
LOCATION: BACK
LOCATION: HIP

## 2022-05-31 ASSESSMENT — PAIN DESCRIPTION - DESCRIPTORS
DESCRIPTORS: ACHING
DESCRIPTORS: SORE;ACHING
DESCRIPTORS: ACHING

## 2022-05-31 ASSESSMENT — PAIN SCALES - GENERAL
PAINLEVEL_OUTOF10: 5
PAINLEVEL_OUTOF10: 5
PAINLEVEL_OUTOF10: 10
PAINLEVEL_OUTOF10: 6
PAINLEVEL_OUTOF10: 0
PAINLEVEL_OUTOF10: 10

## 2022-05-31 ASSESSMENT — PAIN DESCRIPTION - ORIENTATION
ORIENTATION: RIGHT
ORIENTATION: LOWER;LEFT
ORIENTATION: POSTERIOR;LOWER

## 2022-05-31 ASSESSMENT — ENCOUNTER SYMPTOMS: SHORTNESS OF BREATH: 1

## 2022-05-31 NOTE — PROGRESS NOTES
SPEECH PATHOLOGY NOTE:    Speech therapy consult received and appreciated. Chart review completed, and case discussed with RN. Per report, coughing noted with medications this morning. Patient currently NPO for possible procedure. Will follow up for dysphagia evaluation once cleared to participate in po trials.      SABRINA Moreno, CCC-SLP  Speech Language Pathologist  Acute Rehabilitation Services  Contact: [place]

## 2022-05-31 NOTE — PROGRESS NOTES
SPEECH LANGUAGE PATHOLOGY: DYSPHAGIA  Initial Assessment and Discharge    NAME: Kathleen Galeana  : 1935  MRN: [id number]    ADMISSION DATE: 2022  ADMITTING DIAGNOSIS: has Closed left hip fracture ([facility] [address] Pulmonary fibrosis ([facility] [address] Chronic respiratory failure with hypoxia ([facility] [address] Pulmonary infiltrate; Chronic back pain; Opioid dependence ([facility] Hypertension; Hyperlipidemia; Leukocytosis; and Type 2 diabetes mellitus ([facility] on their problem list.  Date of Eval: 2022  ICD-10: Treatment Diagnosis: R13.12 Dysphagia, Oropharyngeal Phase    RECOMMENDATIONS   Diet:  Diet Solids Recommendation: Regular  Liquid Consistency Recommendation: Thin    Medications: Whole with water     Recommendations:  (No ST intervention indicated)   Compensatory Swallowing Strategies:Upright as possible for all oral intake;Small bites/sips   Therapeutic Intervention:Patient/Family education   Patient continues to require skilled intervention: No   D/C Recommendations: No follow up therapy recommended post discharge     ASSESSMENT    Dysphagia Impression : No oropharyngeal dysphagia identified. Patient presents with oropharyngeal swallow function that is within normal limits. No s/sx of dysphagia with any texture. Recommend regular diet/thin liquids. Medications whole with liquid wash. No additional speech therapy indicated at this time time. Please consider re-consult if new concerns arise. GENERAL    History of Present Injury/Illness: Ms. Hecotr Pride  has no past medical history on file. . She also  has no past surgical history on file. Chart Reviewed: Yes  Subjective: Patient alert and cooperative. Per RN report, coughing with meds this morning. Pt/family deny dysphagia at home.   Behavior/Cognition: Alert  Communication Observation: Functional  Follows Directions: Complex  Respiratory Status: O2 via nasual cannula  Current Diet : Regular  Current Liquid Diet : Thin  Pain:   Patient c/o pain    Pain Location: Hip  Pain Assessment: 0-10    Pain Level: 5                           OBJECTIVE        Oral Motor   Labial: No impairment (slight facial droop and numbness on L side from prior surgery per pt/report)  Dentition: Intact  Oral Hygiene: Moist;Clean  Lingual: No impairment    Oropharyngeal Phase:     Assessment Method(s): Observation;Palpation  Vocal Quality: Low volume  Consistency Presented: Mixed consistency;Pureed;Regular; Thin  How Presented: Self-fed/presented;Cup/sip;Spoon;Straw;Successive Swallows  Bolus Acceptance: No impairment  Bolus Formation/Control: No impairment  Propulsion: No impairment  Oral Residue: None  Initiation of Swallow: No impairment  Laryngeal Elevation: Functional  Aspiration Signs/Symptoms: None  Pharyngeal Phase Characteristics: No impairment, issues, or problems  Effective Modifications:  (N/A)  Cues for Modifications:  (N/A)        Oral Phase - Comment: WNL  Pharyngeal Phase: WNL  PLAN    Duration/Frequency: No treatment indicated at this time    Dysphagia Outcome and Severity Scale (IRIS)  Dysphagia Outcome Severity Scale: Level 7: Normal in all situations  Interpretation of Tool: The Dysphagia Outcome and Severity Scale (IRIS) is a simple, easy-to-use, 7-point scale developed to systematically rate the functional severity of dysphagia based on objective assessment and make recommendations for diet level, independence level, and type of nutrition. Normal(7), Functional(6), Mild(5), Mild-Moderate(4), Moderate(3), Moderate-Severe(2), Severe(1)    Speech Therapy Prognosis  Prognosis: Excellent  Prognosis Considerations: Medical Diagnosis    Education: Patient,RN,Family member   Patient Education: Dysphagia. Role of SLP   Patient Education Response: Verbalizes understanding    Current Medications:   No current facility-administered medications on file prior to encounter. No current outpatient medications on file prior to encounter.        PRECAUTIONS/ALLERGIES: Carbamazepine, Metronidazole, Prednisone, and Tetanus toxoid   Safety Devices in place: Yes  Type of devices: Left in chair,Nurse notified    Therapy Time  SLP Individual Minutes  Time In: 9001  Time Out: [address]  Minutes: [address] [place], [place]  5/31/2022 2:29 PM

## 2022-05-31 NOTE — PROGRESS NOTES
and morphine ER 15 mg every 8 hours at home  -Will hold these meds in light of need for IV pain meds for acute hip fracture      Hypertension  Plan:   -Coreg  -As needed IV hydralazine      Hyperlipidemia  Plan:   -Lipitor       DM2:  -Accu checks with sliding scale insulin    Dysphagia  Plan:  -Patient endorses long history  -SLP eval      Dispo/Discharge Planning:   TBD     Diet: Diet NPO  VTE ppx: SCD  Code status: Full Code     Hospital Problems:  Principal Problem:    Closed left subtrochanteric femur fracture, initial encounter ([facility] [address]  Active Problems:    Pulmonary fibrosis (HCC)    Chronic respiratory failure with hypoxia ([facility] [address]    Pulmonary infiltrate    Chronic back pain    Opioid dependence ([facility] [address]    Hypertension    Hyperlipidemia    Leukocytosis    Discharge Planning:      TBD     Diet:  Diet NPO  DVT PPx: SCD  Code status: Full Code     Objective:   Patient Vitals for the past 24 hrs:   Temp Pulse Resp BP SpO2   05/31/22 1113 97.3 °F (36.3 °C) 86 -- (!) 162/78 --   05/31/22 0932 -- 91 16 -- 94 %   05/31/22 0731 97.7 °F (36.5 °C) 82 20 (!) 151/82 95 %   05/31/22 0417 98.5 °F (36.9 °C) 85 19 (!) 171/80 97 %   05/30/22 2309 97.7 °F (36.5 °C) 84 19 (!) 152/65 97 %   05/30/22 2137 -- -- 19 -- --   05/30/22 2055 97.8 °F (36.6 °C) 98 19 (!) 176/77 97 %   05/30/22 1930 -- 93 -- -- 98 %   05/30/22 1925 -- 85 -- -- 100 %   05/30/22 1842 -- 85 -- (!) 154/76 94 %   05/30/22 1835 -- 84 -- (!) 170/74 92 %   05/30/22 1500 99.5 °F (37.5 °C) 91 16 (!) 150/70 95 %         Estimated body mass index is 27.44 kg/m² as calculated from the following:    Height as of this encounter: 5' 6\" (1.676 m). Weight as of this encounter: 170 lb (77.1 kg). Intake/Output Summary (Last 24 hours) at 5/31/2022 1123  Last data filed at 5/30/2022 2310  Gross per 24 hour   Intake --   Output 450 ml   Net -450 ml         Physical Exam:     Blood pressure (!) 162/78, pulse 86, temperature 97.3 °F (36.3 °C), temperature source Axillary, resp. Eos # 0.2 0.0 - 0.8 K/UL    Basophils Absolute 0.0 0.0 - 0.2 K/UL    Absolute Immature Granulocyte 0.1 0.0 - 0.5 K/UL   Comprehensive Metabolic Panel    Collection Time: 05/30/22  3:12 PM   Result Value Ref Range    Sodium 138 136 - 145 mmol/L    Potassium 4.3 3.5 - 5.1 mmol/L    Chloride 102 98 - 107 mmol/L    CO2 33 (H) 21 - 32 mmol/L    Anion Gap 3 (L) 7 - 16 mmol/L    Glucose 186 (H) 65 - 100 mg/dL    BUN 14 8 - 23 MG/DL    CREATININE 1.00 0.6 - 1.0 MG/DL    GFR African American >60 >60 ml/min/1.73m2    GFR Non- 56 (L) >60 ml/min/1.73m2    Calcium 9.2 8.3 - 10.4 MG/DL    Total Bilirubin 0.3 0.2 - 1.1 MG/DL    ALT 17 12 - 65 U/L    AST 18 15 - 37 U/L    Alk Phosphatase 90 50 - 136 U/L    Total Protein 7.2 6.3 - 8.2 g/dL    Albumin 3.1 (L) 3.2 - 4.6 g/dL    Globulin 4.1 (H) 2.3 - 3.5 g/dL    Albumin/Globulin Ratio 0.8 (L) 1.2 - 3.5     Urinalysis    Collection Time: 05/30/22  5:03 PM   Result Value Ref Range    Color, UA YELLOW      Appearance CLEAR      Specific Gravity, UA 1.015 1.001 - 1.023      pH, Urine 6.5 5.0 - 9.0      Protein, UA Negative NEG mg/dL    Glucose, UA Negative mg/dL    Ketones, Urine Negative NEG mg/dL    Bilirubin Urine Negative NEG      Blood, Urine Negative NEG      Urobilinogen, Urine 0.2 0.2 - 1.0 EU/dL    Nitrite, Urine Negative NEG      Leukocyte Esterase, Urine Negative NEG     POCT Glucose    Collection Time: 05/30/22  9:05 PM   Result Value Ref Range    POC Glucose 165 (H) 65 - 100 mg/dL    Performed by: Lisset    Basic Metabolic Panel w/ Reflex to MG    Collection Time: 05/31/22  6:10 AM   Result Value Ref Range    Sodium 140 136 - 145 mmol/L    Potassium 3.6 3.5 - 5.1 mmol/L    Chloride 103 98 - 107 mmol/L    CO2 33 (H) 21 - 32 mmol/L    Anion Gap 4 (L) 7 - 16 mmol/L    Glucose 168 (H) 65 - 100 mg/dL    BUN 15 8 - 23 MG/DL    CREATININE 0.90 0.6 - 1.0 MG/DL    GFR African American >60 >60 ml/min/1.73m2    GFR Non- >60 >60 ml/min/1.73m2 Calcium 9.0 8.3 - 10.4 MG/DL   CBC with Auto Differential    Collection Time: 05/31/22  6:10 AM   Result Value Ref Range    WBC 10.4 4.3 - 11.1 K/uL    RBC 3.46 (L) 4.05 - 5.2 M/uL    Hemoglobin 10.1 (L) 11.7 - 15.4 g/dL    Hematocrit 31.5 (L) 35.8 - 46.3 %    MCV 91.0 79.6 - 97.8 FL    MCH 29.2 26.1 - 32.9 PG    MCHC 32.1 31.4 - 35.0 g/dL    RDW 12.6 11.9 - 14.6 %    Platelets 137 740 - 344 K/uL    MPV 11.2 9.4 - 12.3 FL    nRBC 0.00 0.0 - 0.2 K/uL    Differential Type AUTOMATED      Seg Neutrophils 81 (H) 43 - 78 %    Lymphocytes 10 (L) 13 - 44 %    Monocytes 6 4.0 - 12.0 %    Eosinophils % 2 0.5 - 7.8 %    Basophils 0 0.0 - 2.0 %    Immature Granulocytes 1 0.0 - 5.0 %    Segs Absolute 8.5 (H) 1.7 - 8.2 K/UL    Absolute Lymph # 1.0 0.5 - 4.6 K/UL    Absolute Mono # 0.6 0.1 - 1.3 K/UL    Absolute Eos # 0.2 0.0 - 0.8 K/UL    Basophils Absolute 0.0 0.0 - 0.2 K/UL    Absolute Immature Granulocyte 0.1 0.0 - 0.5 K/UL   POCT Glucose    Collection Time: 05/31/22  7:33 AM   Result Value Ref Range    POC Glucose 172 (H) 65 - 100 mg/dL    Performed by: Ariel        [email or web address]    Other Studies:  [unfilled]    Current Meds:  Current Facility-Administered Medications   Medication Dose Route Frequency    tuberculin injection 5 Units  5 Units IntraDERmal Once    HYDROmorphone HCl PF (DILAUDID) injection 1 mg  1 mg IntraVENous Q4H PRN    sodium chloride flush 0.9 % injection 5-40 mL  5-40 mL IntraVENous 2 times per day    sodium chloride flush 0.9 % injection 5-40 mL  5-40 mL IntraVENous PRN    0.9 % sodium chloride infusion   IntraVENous PRN    ondansetron (ZOFRAN-ODT) disintegrating tablet 4 mg  4 mg Oral Q8H PRN    Or    ondansetron (ZOFRAN) injection 4 mg  4 mg IntraVENous Q6H PRN    polyethylene glycol (GLYCOLAX) packet 17 g  17 g Oral Daily PRN    acetaminophen (TYLENOL) tablet 650 mg  650 mg Oral Q6H PRN    oxyCODONE (ROXICODONE) immediate release tablet 5 mg  5 mg Oral Q4H PRN    cefTRIAXone (ROCEPHIN) 2000 mg IVPB in NS 50ml minibag  2,000 mg IntraVENous Q24H    azithromycin (ZITHROMAX) 500 mg in sodium chloride 0.9 % 250 mL IVPB ([id number])  500 mg IntraVENous Q24H    hydrALAZINE (APRESOLINE) injection 10 mg  10 mg IntraVENous Q6H PRN    albuterol (PROVENTIL) nebulizer solution 2.5 mg  2.5 mg Nebulization Q6H PRN    aspirin chewable tablet 81 mg  81 mg Oral Daily    atorvastatin (LIPITOR) tablet 20 mg  20 mg Oral Nightly    carvedilol (COREG) tablet 12.5 mg  12.5 mg Oral BID WC    diazePAM (VALIUM) tablet 2 mg  2 mg Oral Q12H PRN    Pirfenidone TABS 801 mg (Patient Supplied)  801 mg Oral TID    ferrous sulfate (IRON 325) tablet 325 mg  325 mg Oral Daily with breakfast    furosemide (LASIX) tablet 40 mg  40 mg Oral BID    gabapentin (NEURONTIN) capsule 100 mg  100 mg Oral TID    losartan (COZAAR) tablet 50 mg  50 mg Oral Daily    pantoprazole (PROTONIX) tablet 40 mg  40 mg Oral QAM AC    tiotropium (SPIRIVA RESPIMAT) 2.5 MCG/ACT inhaler 2 puff  2 puff Inhalation Daily    topiramate (TOPAMAX) tablet 25 mg  25 mg Oral BID    traZODone (DESYREL) tablet 150 mg  150 mg Oral Nightly    insulin lispro (HUMALOG) injection vial 0-4 Units  0-4 Units SubCUTAneous TID WC    insulin lispro (HUMALOG) injection vial 0-4 Units  0-4 Units SubCUTAneous Nightly       Signed:  JAVIER Kraft - CNP    Part of this note may have been written by using a voice dictation software. The note has been proof read but may still contain some grammatical/other typographical errors.

## 2022-05-31 NOTE — PROGRESS NOTES
Head to toe skin assessment completed by myself and Yahaira Alba RN. Findings documented in Flowsheet.

## 2022-05-31 NOTE — PROGRESS NOTES
Ortho has not seen patient. Consult put in yesterday. Called to confirm they received consultation. Irene Go made aware and will review patient's chart at this time.

## 2022-05-31 NOTE — ANESTHESIA PRE PROCEDURE
Department of Anesthesiology  Preprocedure Note       Name:  Patricia Dillon   Age:  81 y.o.  :  1935                                          MRN:  [id number]         Date:  2022      Surgeon: Marisela Salvador):  Griselda Hernández MD    Procedure: Procedure(s):  LEFT HIP OPEN REDUCTION INTERNAL FIXATION/ CHOICE/     Medications prior to admission:   Prior to Admission medications    Not on File       Current medications:    Current Facility-Administered Medications   Medication Dose Route Frequency Provider Last Rate Last Admin    tuberculin injection 5 Units  5 Units IntraDERmal Once Justina Sharma MD   5 Units at 22 1737    HYDROmorphone HCl PF (DILAUDID) injection 1 mg  1 mg IntraVENous Q4H PRN JAVIER Rdz - CNP   1 mg at 22 1740    sodium chloride flush 0.9 % injection 5-40 mL  5-40 mL IntraVENous 2 times per day Hong Odonnell MD   10 mL at 22 0939    sodium chloride flush 0.9 % injection 5-40 mL  5-40 mL IntraVENous PRN Hong Odonnell MD        0.9 % sodium chloride infusion   IntraVENous PRN Hong Odonnell MD        ondansetron (ZOFRAN-ODT) disintegrating tablet 4 mg  4 mg Oral Q8H PRN Hong Odonnell MD        Or    ondansetron (ZOFRAN) injection 4 mg  4 mg IntraVENous Q6H PRN Hong Odonnell MD   4 mg at 22 0531    polyethylene glycol (GLYCOLAX) packet 17 g  17 g Oral Daily PRN Hong Odonnell MD        acetaminophen (TYLENOL) tablet 650 mg  650 mg Oral Q6H PRN Hnog Odonnell MD        oxyCODONE (ROXICODONE) immediate release tablet 5 mg  5 mg Oral Q4H PRN Hong Odonnell MD   5 mg at 22 1521    cefTRIAXone (ROCEPHIN) 2000 mg IVPB in NS 50ml minibag  2,000 mg IntraVENous Q24H Hong Odonnell MD   Stopped at 22 2156    azithromycin (ZITHROMAX) 500 mg in sodium chloride 0.9 % 250 mL IVPB ([id number])  500 mg IntraVENous Q24H Hong Odonnell MD   Stopped at 22 2317    hydrALAZINE (APRESOLINE) injection 10 mg  10 mg IntraVENous Q6H PRN Lavinia Durant MD        albuterol (PROVENTIL) nebulizer solution 2.5 mg  2.5 mg Nebulization Q6H PRN Lavinia Durant MD        aspirin chewable tablet 81 mg  81 mg Oral Daily Lavinia Durant MD        atorvastatin (LIPITOR) tablet 20 mg  20 mg Oral Nightly Lavinia Durant MD   20 mg at 05/30/22 2151    carvedilol (COREG) tablet 12.5 mg  12.5 mg Oral BID  Lavinia Durant MD   12.5 mg at 05/31/22 1739    diazePAM (VALIUM) tablet 2 mg  2 mg Oral Q12H PRN Lavinia Durant MD        Pirfenidone TABS 801 mg (Patient Supplied)  801 mg Oral TID Lavinia Durant MD        ferrous sulfate (IRON 325) tablet 325 mg  325 mg Oral Daily with breakfast Lavinia Durant MD        furosemide (LASIX) tablet 40 mg  40 mg Oral BID Lavinia Durant MD   40 mg at 05/31/22 1739    gabapentin (NEURONTIN) capsule 100 mg  100 mg Oral TID Lavinia Durant MD   100 mg at 05/31/22 1540    losartan (COZAAR) tablet 50 mg  50 mg Oral Daily Lavinia Durant MD   50 mg at 05/31/22 0937    pantoprazole (PROTONIX) tablet 40 mg  40 mg Oral QAM AC Lavinia Durant MD        tiotropium (SPIRIVA RESPIMAT) 2.5 MCG/ACT inhaler 2 puff  2 puff Inhalation Daily Lavinia Durant MD   2 puff at 05/31/22 0930    topiramate (TOPAMAX) tablet 25 mg  25 mg Oral BID Lavinia Durant MD   25 mg at 05/31/22 0937    traZODone (DESYREL) tablet 150 mg  150 mg Oral Nightly Lavinia Durant MD   150 mg at 05/30/22 2152    insulin lispro (HUMALOG) injection vial 0-4 Units  0-4 Units SubCUTAneous TID  Lavinia Durant MD        insulin lispro (HUMALOG) injection vial 0-4 Units  0-4 Units SubCUTAneous Nightly Lavinia Durant MD           Allergies:     Allergies   Allergen Reactions    Carbamazepine     Metronidazole Nausea Only     Other reaction(s): Nausea    Prednisone      Other reaction(s): Rash    Tetanus Toxoid      Other reaction(s): Rash Problem List:    Patient Active Problem List   Diagnosis Code    Closed left hip fracture (HCC) S72.002A    Pulmonary fibrosis (HCC) J84.10    Chronic respiratory failure with hypoxia (HCC) J96.11    Pulmonary infiltrate R91.8    Chronic back pain M54.9, G89.29    Opioid dependence ([facility] F11.20    Hypertension I10    Hyperlipidemia E78.5    Leukocytosis D72.829    Type 2 diabetes mellitus ([facility] E11.9       Past Medical History:  No past medical history on file. Past Surgical History:  No past surgical history on file. Social History:    Social History     Tobacco Use    Smoking status: Not on file    Smokeless tobacco: Not on file   Substance Use Topics    Alcohol use: Not on file                                Counseling given: Not Answered      Vital Signs (Current):   Vitals:    05/31/22 1300 05/31/22 1521 05/31/22 1641 05/31/22 1932   BP: (!) 148/72  (!) 165/70 (!) 144/69   Pulse:   83 84   Resp:  18 21 18   Temp:   98.9 °F (37.2 °C) 98.1 °F (36.7 °C)   TempSrc:   Oral Oral   SpO2:   91% (!) 89%   Weight:       Height:                                                  BP Readings from Last 3 Encounters:   05/31/22 (!) 144/69       NPO Status:                                                                                 BMI:   Wt Readings from Last 3 Encounters:   05/30/22 170 lb (77.1 kg)     Body mass index is 27.44 kg/m².     CBC:   Lab Results   Component Value Date    WBC 10.4 05/31/2022    RBC 3.46 05/31/2022    HGB 10.1 05/31/2022    HCT 31.5 05/31/2022    MCV 91.0 05/31/2022    RDW 12.6 05/31/2022     05/31/2022       CMP:   Lab Results   Component Value Date     05/31/2022    K 3.6 05/31/2022     05/31/2022    CO2 33 05/31/2022    BUN 15 05/31/2022    CREATININE 0.90 05/31/2022    GFRAA >60 05/31/2022    LABGLOM >60 05/31/2022    GLUCOSE 168 05/31/2022    PROT 7.2 05/30/2022    CALCIUM 9.0 05/31/2022    BILITOT 0.3 05/30/2022    ALKPHOS 90 05/30/2022    AST 18 05/30/2022    ALT 17 05/30/2022       POC Tests:   Recent Labs     05/31/22  1641   POCGLU 193*       Coags: No results found for: PROTIME, INR, APTT    HCG (If Applicable): No results found for: PREGTESTUR, PREGSERUM, HCG, HCGQUANT     ABGs: No results found for: PHART, PO2ART, CJB5YVC, DPR0ECS, BEART, U3KBZPUL     Type & Screen (If Applicable):  No results found for: LABABO, LABRH    Drug/Infectious Status (If Applicable):  No results found for: HIV, HEPCAB    COVID-19 Screening (If Applicable): No results found for: COVID19        Anesthesia Evaluation  Patient summary reviewed and Nursing notes reviewed  Airway: Mallampati: III     Neck ROM: full  Mouth opening: < 3 FB   Dental:    (+) upper dentures and lower dentures      Pulmonary:   (+) shortness of breath: chronic and no interval change,  decreased breath sounds: bilateral                           ROS comment: Pulmonary fibrosis according to , but gets around wearing 2L NC, also will go without oxygen sometimes   Cardiovascular:  Exercise tolerance: poor (<4 METS),   (+) hypertension:, hyperlipidemia    (-) murmur      Rhythm: regular  Rate: normal                 ROS comment: Activity significantly limited by back pain/arthritis - gets around the house but otherwise very inactive     Echo 2021- normal LV fxn, no sign valvular issues     Neuro/Psych:                ROS comment: Confused, she knew her name and birthday but was not completely oriented to the procedure and why she was in the hospital GI/Hepatic/Renal: Neg GI/Hepatic/Renal ROS            Endo/Other:    (+) DiabetesType II DM, , : arthritis (severe - chronic pain meds. Reportedly has significant scoliosis):., .                 Abdominal:             Vascular: negative vascular ROS. Other Findings:           Anesthesia Plan      general     ASA 4     (GETA. Glidescope in room )  Induction: intravenous.     MIPS: Postoperative opioids intended and Prophylactic antiemetics administered. Anesthetic plan and risks discussed with patient and spouse. Plan discussed with CRNA. Patient has significant back issues/scoliosis associated with severe pain with movement. Will plan on GETA. Discussed possibility of post-op ventilation. Family endorsed understanding and wish to proceed.

## 2022-05-31 NOTE — H&P
Hospitalist History and Physical   Admit Date:  2022  2:55 PM   Name:  Ricarda Jerry   Age:  81 y.o. Sex:  female  :  1935   MRN:  [id number]   Room:  604/    Presenting Complaint: Fall     Reason(s) for Admission: Closed fracture of left hip, initial encounter [facility]) [S72.002A]  Closed left subtrochanteric femur fracture, initial encounter ([facility] [S72.22XA]     History of Present Illness:       Ricarda Jerry is a 81 y.o. female with medical history of pulmonary fibrosis, chronic hypoxic respiratory failure on 2 L NC, HTN, chronic lower back pain taking opioids,  DM2 who is evaluated after a fall while tripping over a rug in the bathroom last night. She did ambulate to her bed but had increased pain today and presented to the ED. Her  is present and gives history as patient is in pain and not very interactive. Xray left femur shows inability to exclude nondisplaced left femur fracture that is confirmed on CT hip. CT head negative. CXR shows left lower infiltrate. [facility]. Has some dyspnea/cough. She has had prior surgery with tolerance to anesthesia.  states she does not complain of chest pain.      EKG tracing personally reviewed as NSR.     meds confirmed with Mr. Emelia Vale- [phone]    Review of Systems:  10 systems not possible due to pain and interactions         Assessment & Plan:     Principal Problem:    Closed left subtrochanteric femur fracture, initial encounter ([facility]  Plan:   · Admit to medical bed   · NPO  · Ortho consult for surgical repair   · PPD  · She is high risk due to her lung disease        Active Problems:    Pulmonary fibrosis ([facility]  Plan:     Chronic respiratory failure with hypoxia ([facility]  Plan:     Pulmonary infiltrate  Plan:  · 2 L NC baseline   · Followed [facility]   · D1 rocephin and azithromycin due to LLL infiltrate with leukocytosis   · Continued spiriva, esbrief, as needed albuterol Chronic back pain  Plan:    Opioid dependence ([facility]  Plan:   · She takes percocet 7.5 mg for brekthrough pain and morphine ER 15 mg every 8 hours at home  · Will hold these meds in light of need for IV pain meds for acute hip fracture           Hypertension  Plan:   · Resume coreg  · As needed IV hydralazine           Hyperlipidemia  Plan:   · Resume lipitor           Leukocytosis  Plan:   · Trend lab  · Antibiotics as noted       DM2:  · Add sliding scale insulin      Dispo/Discharge Planning:   Pending, PPD    Diet: Diet NPO  VTE ppx: SCD  Code status: Full Code    Hospital Problems:  Principal Problem:    Closed left subtrochanteric femur fracture, initial encounter ([facility]  Active Problems:    Pulmonary fibrosis (HCC)    Chronic respiratory failure with hypoxia ([facility]    Pulmonary infiltrate    Chronic back pain    Opioid dependence ([facility]    Hypertension    Hyperlipidemia    Leukocytosis  Resolved Problems:    * No resolved hospital problems. *       Past History:   No past medical history on file. No past surgical history on file. Allergies   Allergen Reactions    Carbamazepine     Metronidazole Nausea Only     Other reaction(s): Nausea    Prednisone      Other reaction(s): Rash    Tetanus Toxoid      Other reaction(s): Rash      Social History     Tobacco Use    Smoking status: No    Smokeless tobacco: No   Substance Use Topics    Alcohol use: No        Family history -  states all healthy         There is no immunization history on file for this patient.   Prior to Admit Medications:  No current outpatient medications      Objective:     Patient Vitals for the past 24 hrs:   Temp Pulse Resp BP SpO2   05/30/22 1930 -- 93 -- -- 98 %   05/30/22 1925 -- 85 -- -- 100 %   05/30/22 1842 -- 85 -- (!) 154/76 94 %   05/30/22 1835 -- 84 -- (!) 170/74 92 %   05/30/22 1500 99.5 °F (37.5 °C) 91 16 (!) 150/70 95 %       Oxygen Therapy  SpO2: 98 %  Pulse Oximeter Device Mode: Intermittent  O2 Device: Nasal cannula  O2 Flow Rate (L/min): 4 L/min    Estimated body mass index is 27.44 kg/m² as calculated from the following:    Height as of this encounter: 5' 6\" (1.676 m). Weight as of this encounter: 170 lb (77.1 kg). No intake or output data in the 24 hours ending 05/30/22 2041      Physical Exam:    Blood pressure (!) 154/76, pulse 93, temperature 99.5 °F (37.5 °C), temperature source Oral, resp. rate 16, height 5' 6\" (1.676 m), weight 170 lb (77.1 kg), SpO2 98 %. General:    Well nourished. No overt distress, elderly, in pain  Head:  Normocephalic, atraumatic  Eyes:  Sclerae appear normal.  Pupils equally round. ENT:  Nares appear normal, no drainage. Moist oral mucosa  Neck:  No restricted ROM. Trachea midline   CV:   RRR. No m/r/g. No jugular venous distension. , no edema   Lungs:   CTAB. No wheezing, rhonchi, or rales. Respirations even, unlabored anterior   Abdomen: Bowel sounds present. Soft, nontender, nondistended. obese  Extremities: No cyanosis or clubbing. No edema  Skin:     No rashes and normal coloration. Warm and dry. Neuro:   grossly intact.    Psych:  Anxious     I have reviewed ordered lab tests and independently visualized imaging below:    Last 24hr Labs:  Recent Results (from the past 24 hour(s))   EKG 12 Lead    Collection Time: 05/30/22  3:08 PM   Result Value Ref Range    Ventricular Rate 90 BPM    Atrial Rate 90 BPM    P-R Interval 182 ms    QRS Duration 53 ms    Q-T Interval 440 ms    QTc Calculation (Bazett) 539 ms    P Axis 63 degrees    R Axis 45 degrees    T Axis 8 degrees    Diagnosis Sinus rhythm    CBC with Auto Differential    Collection Time: 05/30/22  3:12 PM   Result Value Ref Range    WBC 14.0 (H) 4.3 - 11.1 K/uL    RBC 3.81 (L) 4.05 - 5.2 M/uL    Hemoglobin 11.1 (L) 11.7 - 15.4 g/dL    Hematocrit 34.6 (L) 35.8 - 46.3 %    MCV 90.8 79.6 - 97.8 FL    MCH 29.1 26.1 - 32.9 PG    MCHC 32.1 31.4 - 35.0 g/dL    RDW 12.4 11.9 - 14.6 %    Platelets 737 469 - 748 K/uL MPV 11.4 9.4 - 12.3 FL    nRBC 0.00 0.0 - 0.2 K/uL    Differential Type AUTOMATED      Seg Neutrophils 86 (H) 43 - 78 %    Lymphocytes 8 (L) 13 - 44 %    Monocytes 5 4.0 - 12.0 %    Eosinophils % 1 0.5 - 7.8 %    Basophils 0 0.0 - 2.0 %    Immature Granulocytes 0 0.0 - 5.0 %    Segs Absolute 11.9 (H) 1.7 - 8.2 K/UL    Absolute Lymph # 1.2 0.5 - 4.6 K/UL    Absolute Mono # 0.7 0.1 - 1.3 K/UL    Absolute Eos # 0.2 0.0 - 0.8 K/UL    Basophils Absolute 0.0 0.0 - 0.2 K/UL    Absolute Immature Granulocyte 0.1 0.0 - 0.5 K/UL   Comprehensive Metabolic Panel    Collection Time: 05/30/22  3:12 PM   Result Value Ref Range    Sodium 138 136 - 145 mmol/L    Potassium 4.3 3.5 - 5.1 mmol/L    Chloride 102 98 - 107 mmol/L    CO2 33 (H) 21 - 32 mmol/L    Anion Gap 3 (L) 7 - 16 mmol/L    Glucose 186 (H) 65 - 100 mg/dL    BUN 14 8 - 23 MG/DL    CREATININE 1.00 0.6 - 1.0 MG/DL    GFR African American >60 >60 ml/min/1.73m2    GFR Non- 56 (L) >60 ml/min/1.73m2    Calcium 9.2 8.3 - 10.4 MG/DL    Total Bilirubin 0.3 0.2 - 1.1 MG/DL    ALT 17 12 - 65 U/L    AST 18 15 - 37 U/L    Alk Phosphatase 90 50 - 136 U/L    Total Protein 7.2 6.3 - 8.2 g/dL    Albumin 3.1 (L) 3.2 - 4.6 g/dL    Globulin 4.1 (H) 2.3 - 3.5 g/dL    Albumin/Globulin Ratio 0.8 (L) 1.2 - 3.5     Urinalysis    Collection Time: 05/30/22  5:03 PM   Result Value Ref Range    Color, UA YELLOW      Appearance CLEAR      Specific Gravity, UA 1.015 1.001 - 1.023      pH, Urine 6.5 5.0 - 9.0      Protein, UA Negative NEG mg/dL    Glucose, UA Negative mg/dL    Ketones, Urine Negative NEG mg/dL    Bilirubin Urine Negative NEG      Blood, Urine Negative NEG      Urobilinogen, Urine 0.2 0.2 - 1.0 EU/dL    Nitrite, Urine Negative NEG      Leukocyte Esterase, Urine Negative NEG         Other Studies:  XR LUMBAR SPINE (2-3 VIEWS)    Result Date: 5/30/2022  Lumbar spine Clinical indication: Acute moderate left posterior low back pain after a fall injury and difficulty walking Comparison: none Technique: 3 views Findings: Very low bone density which limits sensitivity for osseous abnormalities and nondisplaced fractures. Scoliotic curvature, extensive multilevel chronic appearing degenerative changes. There is no evidence of fracture, dislocation, or erosion. Note of constipation. Nonobstructive bowel gas pattern as seen. Cholecystectomy clips in the upper abdomen. No acute fracture. Scoliosis, degenerative changes, low bone density noted. XR FEMUR LEFT (MIN 2 VIEWS)    Result Date: 5/30/2022  Pelvis with left hip Left femur Clinical indication: Acute fall injury with moderate pain involving left hip and pelvis and difficulty walking Comparison: none Technique: 3 views of the pelvis with left hip, 2 views of the left femur Findings: Very low bone density which limits sensitivity for osseous abnormalities and nondisplaced fractures. Pelvis with left hip: Chronic arthropathic changes of the pelvis and both hips not unusual for age. No evidence of a displaced fracture or dislocation. Left hip is held in some degree of rotation which limits detail. Nondisplaced fracture cannot be excluded at this level. Left femur: There is no evidence of displaced fracture, dislocation, or erosion. Lateral view limited by superimposed structures and prominent soft tissues at the level of the proximal femur. 1. No evidence of a displaced fracture or dislocation. 2. Low bone density, and left hip rotation limiting some detail. Cannot exclude nondisplaced proximal femur fracture. CT HEAD WO CONTRAST    Result Date: 5/30/2022  Noncontrast head CT Clinical Indication: Acute traumatic fall injury with left mild frontotemporal head pain, difficulty walking. Technique: Noncontrast axial images were obtained through the brain.  All CT scans at this location are performed using dose modulation techniques as appropriate including the following: Automated exposure control, adjustment of the MA and/or kV evidence of a pleural effusion or pneumothorax. Mild chronic. Bilateral peripheral lung scarring, and scattered subsegmental atelectasis, not unusual for age. Bone density is low. No displaced fracture or dislocation is seen in the visualized thoracic osseous structures. 1. Left basilar pneumonia. 2. Cardiac enlargement and pulmonary edema suggested. CT HIP LEFT WO CONTRAST    Result Date: 5/30/2022  CT of the pelvis and left hip without INDICATION:  Acute fall injury with moderate pain involving left hip and pelvis and difficulty walking, abnormal appearance of left proximal femur on radiograph today indeterminate for fracture COMPARISON: Radiograph of the pelvis, left hip, left femur performed earlier today TECHNIQUE: Dose reduction technique used: Automated exposure Control and/or adjustment of mA and kV according to patient size. Multiple axial images were obtained through the pelvis and the left hip without intravenous contrast. Coronal and sagittal reformatted images were also performed for further evaluation of osseous structures. FINDINGS: Proximal left femur cortex at the junction of the head and neck notable for tiny subtle curvilinear lucency with cortical irregularity, highly suspicious for nondisplaced fracture in this setting. There is no evidence of displaced fracture or subluxation. No focal aggressive lytic or blastic bony lesions are seen. Bone density is low throughout which limits the sensitivity for subtle osseous abnormalities and nondisplaced fractures. There are diffuse degenerative changes not unusual for age. The included pelvic soft tissues are notable for scattered age commensurate vascular calcifications, diverticulosis large bowel, mild fluid distention of urinary bladder. 1. Suspected nondisplaced fracture of left subcapital femoral neck. 2. Low bone density, degenerative changes.      XR HIP 2-3 VW W PELVIS LEFT    Result Date: 5/30/2022  Pelvis with left hip Left femur Clinical indication: Acute fall injury with moderate pain involving left hip and pelvis and difficulty walking Comparison: none Technique: 3 views of the pelvis with left hip, 2 views of the left femur Findings: Very low bone density which limits sensitivity for osseous abnormalities and nondisplaced fractures. Pelvis with left hip: Chronic arthropathic changes of the pelvis and both hips not unusual for age. No evidence of a displaced fracture or dislocation. Left hip is held in some degree of rotation which limits detail. Nondisplaced fracture cannot be excluded at this level. Left femur: There is no evidence of displaced fracture, dislocation, or erosion. Lateral view limited by superimposed structures and prominent soft tissues at the level of the proximal femur. 1. No evidence of a displaced fracture or dislocation. 2. Low bone density, and left hip rotation limiting some detail. Cannot exclude nondisplaced proximal femur fracture. Echocardiogram:  No results found for this or any previous visit.       Meds previously ordered:  Orders Placed This Encounter   Medications    ondansetron (ZOFRAN) injection 4 mg    morphine injection 4 mg    0.9 % sodium chloride bolus    sodium chloride flush 0.9 % injection 5-40 mL    sodium chloride flush 0.9 % injection 5-40 mL    0.9 % sodium chloride infusion    OR Linked Order Group     ondansetron (ZOFRAN-ODT) disintegrating tablet 4 mg     ondansetron (ZOFRAN) injection 4 mg    polyethylene glycol (GLYCOLAX) packet 17 g    acetaminophen (TYLENOL) tablet 650 mg    oxyCODONE (ROXICODONE) immediate release tablet 5 mg    HYDROmorphone HCl PF (DILAUDID) injection 0.5 mg    DISCONTD: HYDROmorphone HCl PF (DILAUDID) injection 0.5 mg    cefTRIAXone (ROCEPHIN) 2000 mg IVPB in NS 50ml minibag     Order Specific Question:   Antimicrobial Indications     Answer:   Pneumonia (CAP)     Order Specific Question:   CAP duration of therapy     Answer:   5 days    azithromycin (ZITHROMAX) 500 mg in sodium chloride 0.9 % 250 mL IVPB ([id number])     Order Specific Question:   Antimicrobial Indications     Answer:   Pneumonia (CAP)     Order Specific Question:   CAP duration of therapy     Answer:   5 days    hydrALAZINE (APRESOLINE) injection 10 mg         Signed:  Tanya Mason MD    Part of this note may have been written by using a voice dictation software. The note has been proof read but may still contain some grammatical/other typographical errors.

## 2022-05-31 NOTE — ED NOTES
TRANSFER - OUT REPORT:    Verbal report given to Iliana Garcia RN on Patricia Dillon  being transferred to [address] [place] [address] for routine progression of patient care       Report consisted of patient's Situation, Background, Assessment and   Recommendations(SBAR). Information from the following report(s) Nurse Handoff Report, ED Encounter Summary, ED SBAR and Adult Overview was reviewed with the receiving nurse. Lines:   Peripheral IV 05/30/22 Right Antecubital (Active)        Opportunity for questions and clarification was provided.       Patient transported with:  O2 @ 4lpm       Joycelyn Hamm RN  05/30/22 2010

## 2022-05-31 NOTE — PROGRESS NOTES
[facility]        Consult Received:       Closed left hip femoral neck fracture. I have notified Dr. Carl Salvador. Will tentatively plan for surgery today or tomorrow pending OR and surgeon availability. Keep NPO for now. Will change if surgery is to be tomorrow. Hold blood thinners.

## 2022-05-31 NOTE — PROGRESS NOTES
TRANSFER - IN REPORT:    Verbal report received from Darrin Durbin RN on Cole Mason  being received from ED for routine progression of patient care      Report consisted of patient's Situation, Background, Assessment and   Recommendations(SBAR). Information from the following report(s) ED SBAR was reviewed with the receiving nurse. Opportunity for questions and clarification was provided. Assessment completed upon patient's arrival to unit and care assumed.

## 2022-06-01 ENCOUNTER — APPOINTMENT (OUTPATIENT)
Dept: GENERAL RADIOLOGY | Age: 87
DRG: 480 | End: 2022-06-01
Payer: MEDICARE

## 2022-06-01 ENCOUNTER — ANESTHESIA (OUTPATIENT)
Dept: SURGERY | Age: 87
DRG: 480 | End: 2022-06-01
Payer: MEDICARE

## 2022-06-01 LAB
ABO + RH BLD: NORMAL
BLOOD GROUP ANTIBODIES SERPL: NORMAL
GLUCOSE BLD STRIP.AUTO-MCNC: 141 MG/DL (ref 65–100)
GLUCOSE BLD STRIP.AUTO-MCNC: 182 MG/DL (ref 65–100)
GLUCOSE BLD STRIP.AUTO-MCNC: 258 MG/DL (ref 65–100)
GLUCOSE BLD STRIP.AUTO-MCNC: 269 MG/DL (ref 65–100)
GLUCOSE BLD STRIP.AUTO-MCNC: 291 MG/DL (ref 65–100)
MM INDURATION, POC: 0 MM (ref 0–5)
MM INDURATION, POC: 0 MM (ref 0–5)
PPD, POC: NEGATIVE
PPD, POC: NEGATIVE
SERVICE CMNT-IMP: ABNORMAL
SPECIMEN EXP DATE BLD: NORMAL

## 2022-06-01 PROCEDURE — 2580000003 HC RX 258: Performed by: ORTHOPAEDIC SURGERY

## 2022-06-01 PROCEDURE — 6360000002 HC RX W HCPCS: Performed by: ORTHOPAEDIC SURGERY

## 2022-06-01 PROCEDURE — 2500000003 HC RX 250 WO HCPCS: Performed by: NURSE ANESTHETIST, CERTIFIED REGISTERED

## 2022-06-01 PROCEDURE — 86901 BLOOD TYPING SEROLOGIC RH(D): CPT

## 2022-06-01 PROCEDURE — 3700000001 HC ADD 15 MINUTES (ANESTHESIA): Performed by: ORTHOPAEDIC SURGERY

## 2022-06-01 PROCEDURE — 99221 1ST HOSP IP/OBS SF/LOW 40: CPT | Performed by: ORTHOPAEDIC SURGERY

## 2022-06-01 PROCEDURE — 0QS704Z REPOSITION LEFT UPPER FEMUR WITH INTERNAL FIXATION DEVICE, OPEN APPROACH: ICD-10-PCS | Performed by: INTERNAL MEDICINE

## 2022-06-01 PROCEDURE — 3700000000 HC ANESTHESIA ATTENDED CARE: Performed by: ORTHOPAEDIC SURGERY

## 2022-06-01 PROCEDURE — 6360000002 HC RX W HCPCS: Performed by: NURSE ANESTHETIST, CERTIFIED REGISTERED

## 2022-06-01 PROCEDURE — 6370000000 HC RX 637 (ALT 250 FOR IP): Performed by: ANESTHESIOLOGY

## 2022-06-01 PROCEDURE — 3600000014 HC SURGERY LEVEL 4 ADDTL 15MIN: Performed by: ORTHOPAEDIC SURGERY

## 2022-06-01 PROCEDURE — 6360000002 HC RX W HCPCS: Performed by: ANESTHESIOLOGY

## 2022-06-01 PROCEDURE — 2500000003 HC RX 250 WO HCPCS: Performed by: NURSE PRACTITIONER

## 2022-06-01 PROCEDURE — 3600000004 HC SURGERY LEVEL 4 BASE: Performed by: ORTHOPAEDIC SURGERY

## 2022-06-01 PROCEDURE — C1769 GUIDE WIRE: HCPCS | Performed by: ORTHOPAEDIC SURGERY

## 2022-06-01 PROCEDURE — 7100000001 HC PACU RECOVERY - ADDTL 15 MIN: Performed by: ORTHOPAEDIC SURGERY

## 2022-06-01 PROCEDURE — 2580000003 HC RX 258: Performed by: ANESTHESIOLOGY

## 2022-06-01 PROCEDURE — 73502 X-RAY EXAM HIP UNI 2-3 VIEWS: CPT

## 2022-06-01 PROCEDURE — 2720000010 HC SURG SUPPLY STERILE: Performed by: ORTHOPAEDIC SURGERY

## 2022-06-01 PROCEDURE — 6370000000 HC RX 637 (ALT 250 FOR IP): Performed by: INTERNAL MEDICINE

## 2022-06-01 PROCEDURE — 36415 COLL VENOUS BLD VENIPUNCTURE: CPT

## 2022-06-01 PROCEDURE — 82962 GLUCOSE BLOOD TEST: CPT

## 2022-06-01 PROCEDURE — 6370000000 HC RX 637 (ALT 250 FOR IP): Performed by: ORTHOPAEDIC SURGERY

## 2022-06-01 PROCEDURE — 2500000003 HC RX 250 WO HCPCS: Performed by: ORTHOPAEDIC SURGERY

## 2022-06-01 PROCEDURE — C1713 ANCHOR/SCREW BN/BN,TIS/BN: HCPCS | Performed by: ORTHOPAEDIC SURGERY

## 2022-06-01 PROCEDURE — 1100000000 HC RM PRIVATE

## 2022-06-01 PROCEDURE — 2709999900 HC NON-CHARGEABLE SUPPLY: Performed by: ORTHOPAEDIC SURGERY

## 2022-06-01 PROCEDURE — 7100000000 HC PACU RECOVERY - FIRST 15 MIN: Performed by: ORTHOPAEDIC SURGERY

## 2022-06-01 PROCEDURE — 6370000000 HC RX 637 (ALT 250 FOR IP): Performed by: NURSE PRACTITIONER

## 2022-06-01 DEVICE — SCREW BONE L85MM DIA6.4MM BLU TI ALLOY ANTIROTATION T25: Type: IMPLANTABLE DEVICE | Site: HIP | Status: FUNCTIONAL

## 2022-06-01 DEVICE — PLATE BONE 1 H TI ALLOY FOR FEM NK SYS: Type: IMPLANTABLE DEVICE | Site: HIP | Status: FUNCTIONAL

## 2022-06-01 DEVICE — SCREW BONE L33MM DIA5MM ST LCK W/ T25 STARDRV: Type: IMPLANTABLE DEVICE | Site: HIP | Status: FUNCTIONAL

## 2022-06-01 DEVICE — BOLT BONE L85MM DIA10MM GLD TI ALLOY FOR FEM NK SYS: Type: IMPLANTABLE DEVICE | Site: HIP | Status: FUNCTIONAL

## 2022-06-01 RX ORDER — NEOSTIGMINE METHYLSULFATE 1 MG/ML
INJECTION, SOLUTION INTRAVENOUS PRN
Status: DISCONTINUED | OUTPATIENT
Start: 2022-06-01 | End: 2022-06-01 | Stop reason: SDUPTHER

## 2022-06-01 RX ORDER — DIPHENHYDRAMINE HYDROCHLORIDE 50 MG/ML
12.5 INJECTION INTRAMUSCULAR; INTRAVENOUS
Status: DISCONTINUED | OUTPATIENT
Start: 2022-06-01 | End: 2022-06-01

## 2022-06-01 RX ORDER — ACETAMINOPHEN 500 MG
1000 TABLET ORAL ONCE
Status: COMPLETED | OUTPATIENT
Start: 2022-06-01 | End: 2022-06-01

## 2022-06-01 RX ORDER — ONDANSETRON 8 MG/1
4 TABLET, ORALLY DISINTEGRATING ORAL EVERY 8 HOURS PRN
Status: DISCONTINUED | OUTPATIENT
Start: 2022-06-01 | End: 2022-06-03 | Stop reason: HOSPADM

## 2022-06-01 RX ORDER — SODIUM CHLORIDE, SODIUM LACTATE, POTASSIUM CHLORIDE, CALCIUM CHLORIDE 600; 310; 30; 20 MG/100ML; MG/100ML; MG/100ML; MG/100ML
INJECTION, SOLUTION INTRAVENOUS CONTINUOUS
Status: DISCONTINUED | OUTPATIENT
Start: 2022-06-01 | End: 2022-06-01

## 2022-06-01 RX ORDER — OXYCODONE HYDROCHLORIDE 5 MG/1
5 TABLET ORAL
Status: DISCONTINUED | OUTPATIENT
Start: 2022-06-01 | End: 2022-06-01

## 2022-06-01 RX ORDER — MORPHINE SULFATE 15 MG/1
15 TABLET, FILM COATED, EXTENDED RELEASE ORAL EVERY 8 HOURS
Status: DISCONTINUED | OUTPATIENT
Start: 2022-06-01 | End: 2022-06-03 | Stop reason: HOSPADM

## 2022-06-01 RX ORDER — SODIUM CHLORIDE 0.9 % (FLUSH) 0.9 %
5-40 SYRINGE (ML) INJECTION PRN
Status: DISCONTINUED | OUTPATIENT
Start: 2022-06-01 | End: 2022-06-03 | Stop reason: HOSPADM

## 2022-06-01 RX ORDER — SODIUM CHLORIDE 0.9 % (FLUSH) 0.9 %
5-40 SYRINGE (ML) INJECTION PRN
Status: DISCONTINUED | OUTPATIENT
Start: 2022-06-01 | End: 2022-06-01 | Stop reason: HOSPADM

## 2022-06-01 RX ORDER — SODIUM CHLORIDE 0.9 % (FLUSH) 0.9 %
5-40 SYRINGE (ML) INJECTION EVERY 12 HOURS SCHEDULED
Status: DISCONTINUED | OUTPATIENT
Start: 2022-06-01 | End: 2022-06-03 | Stop reason: HOSPADM

## 2022-06-01 RX ORDER — FENTANYL CITRATE 50 UG/ML
100 INJECTION, SOLUTION INTRAMUSCULAR; INTRAVENOUS
Status: DISCONTINUED | OUTPATIENT
Start: 2022-06-01 | End: 2022-06-01 | Stop reason: HOSPADM

## 2022-06-01 RX ORDER — ONDANSETRON 2 MG/ML
INJECTION INTRAMUSCULAR; INTRAVENOUS PRN
Status: DISCONTINUED | OUTPATIENT
Start: 2022-06-01 | End: 2022-06-01 | Stop reason: SDUPTHER

## 2022-06-01 RX ORDER — DEXTROSE MONOHYDRATE 50 MG/ML
100 INJECTION, SOLUTION INTRAVENOUS PRN
Status: DISCONTINUED | OUTPATIENT
Start: 2022-06-01 | End: 2022-06-01

## 2022-06-01 RX ORDER — PROPOFOL 10 MG/ML
INJECTION, EMULSION INTRAVENOUS PRN
Status: DISCONTINUED | OUTPATIENT
Start: 2022-06-01 | End: 2022-06-01 | Stop reason: SDUPTHER

## 2022-06-01 RX ORDER — ROCURONIUM BROMIDE 10 MG/ML
INJECTION, SOLUTION INTRAVENOUS PRN
Status: DISCONTINUED | OUTPATIENT
Start: 2022-06-01 | End: 2022-06-01 | Stop reason: SDUPTHER

## 2022-06-01 RX ORDER — LIDOCAINE HYDROCHLORIDE 20 MG/ML
INJECTION, SOLUTION EPIDURAL; INFILTRATION; INTRACAUDAL; PERINEURAL PRN
Status: DISCONTINUED | OUTPATIENT
Start: 2022-06-01 | End: 2022-06-01 | Stop reason: SDUPTHER

## 2022-06-01 RX ORDER — DEXAMETHASONE SODIUM PHOSPHATE 4 MG/ML
INJECTION, SOLUTION INTRA-ARTICULAR; INTRALESIONAL; INTRAMUSCULAR; INTRAVENOUS; SOFT TISSUE PRN
Status: DISCONTINUED | OUTPATIENT
Start: 2022-06-01 | End: 2022-06-01 | Stop reason: SDUPTHER

## 2022-06-01 RX ORDER — MIDAZOLAM HYDROCHLORIDE 2 MG/2ML
2 INJECTION, SOLUTION INTRAMUSCULAR; INTRAVENOUS
Status: DISCONTINUED | OUTPATIENT
Start: 2022-06-01 | End: 2022-06-01 | Stop reason: HOSPADM

## 2022-06-01 RX ORDER — SODIUM CHLORIDE 9 MG/ML
INJECTION, SOLUTION INTRAVENOUS PRN
Status: DISCONTINUED | OUTPATIENT
Start: 2022-06-01 | End: 2022-06-01

## 2022-06-01 RX ORDER — OXYCODONE AND ACETAMINOPHEN 7.5; 325 MG/1; MG/1
1 TABLET ORAL EVERY 12 HOURS PRN
Status: DISCONTINUED | OUTPATIENT
Start: 2022-06-01 | End: 2022-06-03 | Stop reason: HOSPADM

## 2022-06-01 RX ORDER — HYDROMORPHONE HYDROCHLORIDE 2 MG/ML
0.5 INJECTION, SOLUTION INTRAMUSCULAR; INTRAVENOUS; SUBCUTANEOUS EVERY 10 MIN PRN
Status: DISCONTINUED | OUTPATIENT
Start: 2022-06-01 | End: 2022-06-01

## 2022-06-01 RX ORDER — ONDANSETRON 2 MG/ML
4 INJECTION INTRAMUSCULAR; INTRAVENOUS EVERY 6 HOURS PRN
Status: DISCONTINUED | OUTPATIENT
Start: 2022-06-01 | End: 2022-06-03 | Stop reason: HOSPADM

## 2022-06-01 RX ORDER — SODIUM CHLORIDE, SODIUM LACTATE, POTASSIUM CHLORIDE, CALCIUM CHLORIDE 600; 310; 30; 20 MG/100ML; MG/100ML; MG/100ML; MG/100ML
INJECTION, SOLUTION INTRAVENOUS CONTINUOUS
Status: DISCONTINUED | OUTPATIENT
Start: 2022-06-01 | End: 2022-06-01 | Stop reason: HOSPADM

## 2022-06-01 RX ORDER — GLUCAGON 1 MG/ML
1 KIT INJECTION PRN
Status: DISCONTINUED | OUTPATIENT
Start: 2022-06-01 | End: 2022-06-01

## 2022-06-01 RX ORDER — SODIUM CHLORIDE 0.9 % (FLUSH) 0.9 %
5-40 SYRINGE (ML) INJECTION EVERY 12 HOURS SCHEDULED
Status: DISCONTINUED | OUTPATIENT
Start: 2022-06-01 | End: 2022-06-01 | Stop reason: HOSPADM

## 2022-06-01 RX ORDER — PROCHLORPERAZINE EDISYLATE 5 MG/ML
5 INJECTION INTRAMUSCULAR; INTRAVENOUS
Status: DISCONTINUED | OUTPATIENT
Start: 2022-06-01 | End: 2022-06-01

## 2022-06-01 RX ORDER — SODIUM CHLORIDE 0.9 % (FLUSH) 0.9 %
5-40 SYRINGE (ML) INJECTION EVERY 12 HOURS SCHEDULED
Status: DISCONTINUED | OUTPATIENT
Start: 2022-06-01 | End: 2022-06-01

## 2022-06-01 RX ORDER — SODIUM CHLORIDE 9 MG/ML
INJECTION, SOLUTION INTRAVENOUS PRN
Status: DISCONTINUED | OUTPATIENT
Start: 2022-06-01 | End: 2022-06-01 | Stop reason: HOSPADM

## 2022-06-01 RX ORDER — SODIUM CHLORIDE 0.9 % (FLUSH) 0.9 %
5-40 SYRINGE (ML) INJECTION PRN
Status: DISCONTINUED | OUTPATIENT
Start: 2022-06-01 | End: 2022-06-01

## 2022-06-01 RX ORDER — GLYCOPYRROLATE 0.2 MG/ML
INJECTION INTRAMUSCULAR; INTRAVENOUS PRN
Status: DISCONTINUED | OUTPATIENT
Start: 2022-06-01 | End: 2022-06-01 | Stop reason: SDUPTHER

## 2022-06-01 RX ORDER — SODIUM CHLORIDE 9 MG/ML
INJECTION, SOLUTION INTRAVENOUS PRN
Status: DISCONTINUED | OUTPATIENT
Start: 2022-06-01 | End: 2022-06-03 | Stop reason: HOSPADM

## 2022-06-01 RX ADMIN — SALINE NASAL SPRAY 1 SPRAY: 1.5 SOLUTION NASAL at 17:54

## 2022-06-01 RX ADMIN — LOSARTAN POTASSIUM 50 MG: 50 TABLET, FILM COATED ORAL at 12:16

## 2022-06-01 RX ADMIN — POLYETHYLENE GLYCOL 3350 17 G: 17 POWDER, FOR SOLUTION ORAL at 17:25

## 2022-06-01 RX ADMIN — SODIUM CHLORIDE, SODIUM LACTATE, POTASSIUM CHLORIDE, AND CALCIUM CHLORIDE: 600; 310; 30; 20 INJECTION, SOLUTION INTRAVENOUS at 08:28

## 2022-06-01 RX ADMIN — HYDROMORPHONE HYDROCHLORIDE 0.5 MG: 2 INJECTION, SOLUTION INTRAMUSCULAR; INTRAVENOUS; SUBCUTANEOUS at 10:00

## 2022-06-01 RX ADMIN — ONDANSETRON 4 MG: 2 INJECTION INTRAMUSCULAR; INTRAVENOUS at 09:16

## 2022-06-01 RX ADMIN — SUGAMMADEX 200 MG: 100 INJECTION, SOLUTION INTRAVENOUS at 09:34

## 2022-06-01 RX ADMIN — PROPOFOL 50 MG: 10 INJECTION, EMULSION INTRAVENOUS at 09:03

## 2022-06-01 RX ADMIN — CARVEDILOL 12.5 MG: 12.5 TABLET, FILM COATED ORAL at 16:16

## 2022-06-01 RX ADMIN — CARVEDILOL 12.5 MG: 12.5 TABLET, FILM COATED ORAL at 06:36

## 2022-06-01 RX ADMIN — ATORVASTATIN CALCIUM 20 MG: 10 TABLET, FILM COATED ORAL at 21:03

## 2022-06-01 RX ADMIN — ACETAMINOPHEN 1000 MG: 500 TABLET ORAL at 08:27

## 2022-06-01 RX ADMIN — LIDOCAINE HYDROCHLORIDE 100 MG: 20 INJECTION, SOLUTION EPIDURAL; INFILTRATION; INTRACAUDAL; PERINEURAL at 09:02

## 2022-06-01 RX ADMIN — OXYCODONE 5 MG: 5 TABLET ORAL at 04:03

## 2022-06-01 RX ADMIN — OXYCODONE 5 MG: 5 TABLET ORAL at 12:16

## 2022-06-01 RX ADMIN — GLYCOPYRROLATE 0.4 MG: 0.2 INJECTION, SOLUTION INTRAMUSCULAR; INTRAVENOUS at 09:32

## 2022-06-01 RX ADMIN — HYDROMORPHONE HYDROCHLORIDE 0.5 MG: 2 INJECTION, SOLUTION INTRAMUSCULAR; INTRAVENOUS; SUBCUTANEOUS at 10:11

## 2022-06-01 RX ADMIN — FUROSEMIDE 40 MG: 40 TABLET ORAL at 12:16

## 2022-06-01 RX ADMIN — OXYCODONE AND ACETAMINOPHEN 1 TABLET: 7.5; 325 TABLET ORAL at 16:17

## 2022-06-01 RX ADMIN — FUROSEMIDE 40 MG: 40 TABLET ORAL at 16:16

## 2022-06-01 RX ADMIN — ACETAMINOPHEN 650 MG: 325 TABLET ORAL at 17:57

## 2022-06-01 RX ADMIN — AZITHROMYCIN MONOHYDRATE 500 MG: 500 INJECTION, POWDER, LYOPHILIZED, FOR SOLUTION INTRAVENOUS at 22:16

## 2022-06-01 RX ADMIN — Medication 3 MG: at 09:32

## 2022-06-01 RX ADMIN — DEXAMETHASONE SODIUM PHOSPHATE 10 MG: 4 INJECTION, SOLUTION INTRAMUSCULAR; INTRAVENOUS at 09:16

## 2022-06-01 RX ADMIN — PROPOFOL 100 MG: 10 INJECTION, EMULSION INTRAVENOUS at 09:02

## 2022-06-01 RX ADMIN — TOPIRAMATE 25 MG: 25 TABLET, FILM COATED ORAL at 21:04

## 2022-06-01 RX ADMIN — ASPIRIN 325 MG: 325 TABLET, COATED ORAL at 21:03

## 2022-06-01 RX ADMIN — Medication 2000 MG: at 09:10

## 2022-06-01 RX ADMIN — INSULIN LISPRO 2 UNITS: 100 INJECTION, SOLUTION INTRAVENOUS; SUBCUTANEOUS at 17:54

## 2022-06-01 RX ADMIN — TRAZODONE HYDROCHLORIDE 150 MG: 50 TABLET ORAL at 21:03

## 2022-06-01 RX ADMIN — SODIUM CHLORIDE, PRESERVATIVE FREE 10 ML: 5 INJECTION INTRAVENOUS at 21:07

## 2022-06-01 RX ADMIN — CEFAZOLIN SODIUM 2000 MG: 100 INJECTION, POWDER, LYOPHILIZED, FOR SOLUTION INTRAVENOUS at 16:22

## 2022-06-01 RX ADMIN — GABAPENTIN 100 MG: 100 CAPSULE ORAL at 15:04

## 2022-06-01 RX ADMIN — ASPIRIN 325 MG: 325 TABLET, COATED ORAL at 15:04

## 2022-06-01 RX ADMIN — MORPHINE SULFATE 15 MG: 15 TABLET, EXTENDED RELEASE ORAL at 21:15

## 2022-06-01 RX ADMIN — GABAPENTIN 100 MG: 100 CAPSULE ORAL at 21:03

## 2022-06-01 RX ADMIN — ROCURONIUM BROMIDE 30 MG: 50 INJECTION, SOLUTION INTRAVENOUS at 09:02

## 2022-06-01 RX ADMIN — CEFTRIAXONE 2000 MG: 2 INJECTION, POWDER, FOR SOLUTION INTRAMUSCULAR; INTRAVENOUS at 21:08

## 2022-06-01 RX ADMIN — PANTOPRAZOLE SODIUM 40 MG: 40 TABLET, DELAYED RELEASE ORAL at 05:55

## 2022-06-01 RX ADMIN — PROPOFOL 50 MG: 10 INJECTION, EMULSION INTRAVENOUS at 09:05

## 2022-06-01 RX ADMIN — HYDROMORPHONE HYDROCHLORIDE 1 MG: 1 INJECTION, SOLUTION INTRAMUSCULAR; INTRAVENOUS; SUBCUTANEOUS at 02:28

## 2022-06-01 RX ADMIN — DIAZEPAM 2 MG: 2 TABLET ORAL at 05:55

## 2022-06-01 ASSESSMENT — PAIN SCALES - GENERAL
PAINLEVEL_OUTOF10: 3
PAINLEVEL_OUTOF10: 6
PAINLEVEL_OUTOF10: 4
PAINLEVEL_OUTOF10: 0
PAINLEVEL_OUTOF10: 7
PAINLEVEL_OUTOF10: 6
PAINLEVEL_OUTOF10: 5
PAINLEVEL_OUTOF10: 7
PAINLEVEL_OUTOF10: 8
PAINLEVEL_OUTOF10: 0
PAINLEVEL_OUTOF10: 4
PAINLEVEL_OUTOF10: 3
PAINLEVEL_OUTOF10: 10

## 2022-06-01 ASSESSMENT — PAIN SCALES - WONG BAKER
WONGBAKER_NUMERICALRESPONSE: 0
WONGBAKER_NUMERICALRESPONSE: 2
WONGBAKER_NUMERICALRESPONSE: 4
WONGBAKER_NUMERICALRESPONSE: 0

## 2022-06-01 ASSESSMENT — PAIN DESCRIPTION - LOCATION
LOCATION: BACK
LOCATION: BACK;LEG;HIP
LOCATION: LEG;HIP
LOCATION: BACK
LOCATION: HIP
LOCATION: HIP
LOCATION: HEAD;BACK

## 2022-06-01 ASSESSMENT — PAIN DESCRIPTION - DESCRIPTORS
DESCRIPTORS: ACHING

## 2022-06-01 ASSESSMENT — PAIN DESCRIPTION - ORIENTATION
ORIENTATION: LOWER
ORIENTATION: OTHER (COMMENT)
ORIENTATION: LEFT

## 2022-06-01 ASSESSMENT — PAIN DESCRIPTION - PAIN TYPE: TYPE: CHRONIC PAIN;ACUTE PAIN

## 2022-06-01 NOTE — OP NOTE
Operative Note      Patient: Patricia Dillon  YOB: 1935  MRN: [id number]    Date of Procedure: 6/1/2022    Pre-Op Diagnosis: Closed fracture of left hip femoral neck fracture, initial encounter ([facility] [S72.002A]    Post-Op Diagnosis: Same       Procedure(s):  LEFT HIP OPEN REDUCTION INTERNAL FIXATION/ CHOICE/   Open treatment of left femoral neck fracture with plate and screw fixation  Surgeon(s):  Griselda Hernández MD    Assistant:   * No surgical staff found *    Anesthesia: Monitor Anesthesia Care    Estimated Blood Loss (mL): less than 334     Complications: None    Specimens:   * No specimens in log *    Implants:  Implant Name Type Inv. Item Serial No.  Lot No. LRB No. Used Action   PLATE BONE 1 H TI ALLOY FOR FEM NK SYS - [id number]  PLATE BONE 1 H TI ALLOY FOR FEM NK SYS  [employer]ra[employer] [id number] Left 1 Implanted   BOLT BONE L85MM [id number] GLD TI ALLOY FOR FEM NK SYS - [id number]  BOLT BONE L85MM HER30LT GLD TI ALLOY FOR FEM NK SYS  DEPU[employer] [place] [id number] Left 1 Implanted   SCREW BONE L85MM DIA6.4MM EMILIE TI ALLOY ANTIROTATION T25 - [id number]  SCREW BONE L85MM DIA6.4MM EMILIE TI ALLOY ANTIROTATION T25  [employer] [id number] Left 1 Implanted   SCREW BONE L33MM DIA5MM ST LCK W/ T25 STARDRV - [id number]  SCREW BONE L33MM DIA5MM ST LCK W/ T25 STARDRV  [employer] [id number] Left 1 Implanted         Drains:   Urinary Catheter Cho (Active)   $ Urethral catheter insertion Inserted for procedure 05/31/22 0705   Catheter Indications Perioperative use for selected surgical procedures 05/31/22 1932   Site Assessment No urethral drainage 05/31/22 1932   Urine Color Yellow 05/31/22 1932   Urine Appearance Clear 05/31/22 1932   Collection Container Standard 05/31/22 1932   Securement Method Securing device (Describe) 05/31/22 1932   Catheter Care Completed Yes 05/31/22 1932   Catheter Best Practices  Drainage tube clipped to bed;Catheter secured to thigh; Tamper seal intact; Bag below bladder;Bag not on floor; Lack of dependent loop in tubing;Drainage bag less than half full 05/31/22 1932   Status Patent 05/31/22 1932   Output (mL) 475 mL 06/01/22 0528   Discontinuation Reason Per provider order 05/31/22 1932       Findings:     Detailed Description of Procedure:   After the successful induction of general anesthesia the left lower extremity was placed in boot traction on a fracture table. At that point we made sure we could visualize the hip on both the AP and lateral projection with the C arm image intensifier. Once this was confirmed we then prepped and draped the ostoeporotic  hip. After this was done I then used a guidewire to localize my incision and then made a small incision laterally over the left hip. I placed a guidewire into the center the femoral head on both the AP and the lateral projection. After placing the guidewire and confirmed its position I then measured for a 85 mm screw. The proximal femur was then drilled corresponding to the length of the screw. Once the screw and plate construct was assembled we then placed this with an impacter and confirmed its position on both AP and lateral projection. I then drilled for and placed an antirotation screw corresponding to the same length of the screw through the plate which in this case was a 85 mm screw. I then used the outrigger device and drilled for and placed a single screw in the shaft portion of the plate. As I placed both of my screws I tried to make sure to limit the amount of torque present and make sure that both of these would back out so that there was minimal risk for cold welding of the locking screws to the plate. Once this was in appropriate position I checked my final reduction as well as the placement of my hardware on both AP and lateral projection. I was pleased with this.   I then remove the insertion device and closed my incision with 2.0 Monocryl for the subcutaneous tissue and staples for the skin. Dressings were applied and the patient was awakened and taken to the recovery room in stable condition.         Electronically signed by Arie Humphrey MD on 6/1/2022 at 9:48 AM

## 2022-06-01 NOTE — PERIOP NOTE
TRANSFER - OUT REPORT:    Verbal report given to  on Janel Bojorquez  being transferred to KALIX, RN for routine post-op       Report consisted of patients Situation, Background, Assessment and   Recommendations(SBAR). Information from the following report(s) Nurse Handoff Report, Surgery Report, Intake/Output, MAR, Cardiac Rhythm SR and Neuro Assessment was reviewed with the receiving nurse. Lines:   Peripheral IV 06/01/22 Left Forearm (Active)   Site Assessment Clean, dry & intact 06/01/22 0943   Line Status Capped 06/01/22 [address] checked and tightened 06/01/22 0943   Phlebitis Assessment No symptoms 06/01/22 0943   Infiltration Assessment 0 06/01/22 0943   Alcohol Cap Used No 06/01/22 0943   Dressing Status Clean, dry & intact 06/01/22 0943   Dressing Type Transparent 06/01/22 0943        Opportunity for questions and clarification was provided. Patient transported with:   O2 @ 4 liters  Tech    VTE prophylaxis orders have been written for [facility].

## 2022-06-01 NOTE — ANESTHESIA POSTPROCEDURE EVALUATION
Department of Anesthesiology  Postprocedure Note    Patient: Jose Bruno  MRN: [id number]  YOB: 1935  Date of evaluation: 6/1/2022  Time:  11:34 AM     Procedure Summary     Date: 06/01/22 Room / Location: [facility] MAIN OR  / [facility] MAIN OR    Anesthesia Start: 0856 Anesthesia Stop: 4340    Procedure: LEFT HIP OPEN REDUCTION INTERNAL FIXATION/ CHOICE/  (Left Hip) Diagnosis:       Closed fracture of left hip, initial encounter ([facility]      (Closed fracture of left hip, [facility] encounter ([facility] Kalyan Edward)    Providers: Casey Christie MD Responsible Provider: Flori Quiroga MD    Anesthesia Type: general ASA Status: 4          Anesthesia Type: No value filed. Anya Phase I: Anya Score: 9    Anya Phase II:      Last vitals: Reviewed and per EMR flowsheets.        Anesthesia Post Evaluation    Patient location during evaluation: PACU  Patient participation: complete - patient participated  Level of consciousness: awake  Airway patency: patent  Nausea & Vomiting: no vomiting  Complications: no  Cardiovascular status: blood pressure returned to baseline  Respiratory status: acceptable and nasal airway (near baseline O2 requirement )  Hydration status: stable

## 2022-06-01 NOTE — PROGRESS NOTES
Hospitalist Progress Note   Admit Date:  2022  2:55 PM   Name:  Zelalem Hicks   Age:  81 y.o. Sex:  female  :  1935   MRN:  [id number]   Room:  [facility] [place]/    Presenting Complaint: Fall    Reason(s) for Admission: Closed fracture of left hip, initial encounter ([facility] [S72.002A]  Closed left subtrochanteric femur fracture, initial encounter [facility] [place] Course & Interval History:   Zelalem Hicks is a 81 y.o. female with medical history of pulmonary fibrosis, chronic hypoxic respiratory failure on 2 L NC, HTN, chronic lower back pain taking opioids,  DM2 who is evaluated after a fall while tripping over a rug in the bathroom. She did ambulate to her bed but due to increased pain she presented to the ED.      Xray left femur shows inability to exclude nondisplaced left femur fracture that is confirmed on CT hip. CT head negative. CXR shows left lower infiltrate. [facility].      She has had prior surgery with tolerance to anesthesia.      EKG tracing showing NSR        Subjective/24hr Events (22):  Endorses continued back pain. Denies chest pain, N/V, chills. Patient does endorse that she sometimes has trouble swallowing her pills at home     ROS:  10 systems reviewed and negative except as noted above. Assessment & Plan:   Closed left subtrochanteric femur fracture, initial encounter ([facility]  Plan:   -S/P Left Hip ORIF today by Dr. Hutton Danger  -PPD  -PT/OT pending      Active Problems:  Pulmonary fibrosis ([facility]  Chronic respiratory failure with hypoxia ([facility]  Pulmonary infiltrate  Plan:  -2 L NC baseline   -[facility]   -Rocephin and Azithromycin due to LLL infiltrate.  5 days total treatment    -Continued spiriva, esbrief, as needed albuterol  -White count now normal     Chronic back pain  Opioid dependence ([facility]  Plan:   -She takes percocet 7.5 mg for breakthrough pain and morphine ER 15 mg every 8 hours at home  -Will hold these meds in light of need for IV pain meds for acute hip fracture  6/1/22  -Restarting home meds. Verified on [facility] website.   Patient states pain is not controlled with new regimen     Hypertension  Plan:   -Coreg  -As needed IV hydralazine   -Control pain     Hyperlipidemia  Plan:   -Lipitor       DM2:  -Accu checks with sliding scale insulin    Dysphagia  Plan:  -Patient endorses long history  -SLP eval pending     Dispo/Discharge Planning:   TBD     Diet: Diet NPO  VTE ppx: SCD  Code status: Full Code     Hospital Problems:  Principal Problem:    Closed left subtrochanteric femur fracture, initial encounter [facility])  Active Problems:    Pulmonary fibrosis (HCC)    Chronic respiratory failure with hypoxia ([facility]    Pulmonary infiltrate    Chronic back pain    Opioid dependence ([facility]    Hypertension    Hyperlipidemia    Leukocytosis    Discharge Planning:      TBD     Diet:  Diet NPO  DVT PPx: SCD  Code status: Full Code     Objective:     Patient Vitals for the past 24 hrs:   Temp Pulse Resp BP SpO2   06/01/22 1033 -- 67 18 (!) 164/70 95 %   06/01/22 1030 -- 65 18 (!) 172/72 98 %   06/01/22 1025 -- 65 20 (!) 161/67 100 %   06/01/22 1020 -- 65 18 (!) 161/70 100 %   06/01/22 1015 97.5 °F (36.4 °C) 66 18 (!) 157/70 99 %   06/01/22 1010 -- 70 18 (!) 173/77 96 %   06/01/22 1005 -- 78 18 (!) 161/93 94 %   06/01/22 1000 -- 79 16 (!) 178/71 94 %   06/01/22 0955 -- 80 16 (!) 163/70 92 %   06/01/22 0950 -- 80 16 (!) 163/70 97 %   06/01/22 0945 -- 76 14 (!) 162/74 92 %   06/01/22 0943 97.2 °F (36.2 °C) 78 14 (!) 162/74 94 %   06/01/22 0748 98.4 °F (36.9 °C) 68 18 (!) 182/80 98 %   06/01/22 0433 -- -- 18 -- --   06/01/22 0340 98.2 °F (36.8 °C) 77 17 (!) 165/80 93 %   06/01/22 0258 -- -- 18 -- --   05/31/22 2320 98.6 °F (37 °C) 78 18 (!) 166/81 94 %   05/31/22 2127 -- -- 18 -- --   05/31/22 1932 98.1 °F (36.7 °C) 84 18 (!) 144/69 (!) 89 %   05/31/22 1810 -- -- 18 -- --   05/31/22 1641 98.9 °F (37.2 °C) 83 21 (!) 165/70 91 % 05/31/22 1521 -- -- 18 -- --   05/31/22 1300 -- -- -- (!) 148/72 --   05/31/22 1113 97.3 °F (36.3 °C) 86 18 (!) 162/78 98 %         Estimated body mass index is 28.12 kg/m² as calculated from the following:    Height as of this encounter: 5' 6\" (1.676 m). Weight as of this encounter: 174 lb 3.2 oz (79 kg). Intake/Output Summary (Last 24 hours) at 6/1/2022 1111  Last data filed at 6/1/2022 0932  Gross per 24 hour   Intake 350 ml   Output 990 ml   Net -640 ml         Physical Exam:     Blood pressure (!) 164/70, pulse 67, temperature 97.5 °F (36.4 °C), temperature source Temporal, resp. rate 18, height 5' 6\" (1.676 m), weight 174 lb 3.2 oz (79 kg), SpO2 95 %. General:    Well nourished. Moderate distress due to pain  Head:  Normocephalic, atraumatic  Eyes:  Sclerae appear normal.  Pupils equally round. ENT:  Nares appear normal, no drainage. Moist oral mucosa  Neck:  No restricted ROM. Trachea midline   CV:   RRR. No m/r/g. No jugular venous distension. Lungs:   CTAB. No wheezing, rhonchi, or rales. Respirations even, unlabored  Abdomen: Bowel sounds present. Soft, nontender, nondistended. Extremities: No cyanosis or clubbing. No edema  Skin:     No rashes and normal coloration. Warm and dry. Neuro:  CN II-XII grossly intact. A&Ox3  Psych:  Normal mood and affect.       I have reviewed ordered lab tests and independently visualized imaging below:    Recent Labs:  Recent Results (from the past 48 hour(s))   EKG 12 Lead    Collection Time: 05/30/22  3:08 PM   Result Value Ref Range    Ventricular Rate 90 BPM    Atrial Rate 90 BPM    P-R Interval 182 ms    QRS Duration 53 ms    Q-T Interval 440 ms    QTc Calculation (Bazett) 539 ms    P Axis 63 degrees    R Axis 45 degrees    T Axis 8 degrees    Diagnosis Sinus rhythm    CBC with Auto Differential    Collection Time: 05/30/22  3:12 PM   Result Value Ref Range    WBC 14.0 (H) 4.3 - 11.1 K/uL    RBC 3.81 (L) 4.05 - 5.2 M/uL    Hemoglobin 11.1 (L) 11.7 - 15.4 g/dL    Hematocrit 34.6 (L) 35.8 - 46.3 %    MCV 90.8 79.6 - 97.8 FL    MCH 29.1 26.1 - 32.9 PG    MCHC 32.1 31.4 - 35.0 g/dL    RDW 12.4 11.9 - 14.6 %    Platelets 071 184 - 969 K/uL    MPV 11.4 9.4 - 12.3 FL    nRBC 0.00 0.0 - 0.2 K/uL    Differential Type AUTOMATED      Seg Neutrophils 86 (H) 43 - 78 %    Lymphocytes 8 (L) 13 - 44 %    Monocytes 5 4.0 - 12.0 %    Eosinophils % 1 0.5 - 7.8 %    Basophils 0 0.0 - 2.0 %    Immature Granulocytes 0 0.0 - 5.0 %    Segs Absolute 11.9 (H) 1.7 - 8.2 K/UL    Absolute Lymph # 1.2 0.5 - 4.6 K/UL    Absolute Mono # 0.7 0.1 - 1.3 K/UL    Absolute Eos # 0.2 0.0 - 0.8 K/UL    Basophils Absolute 0.0 0.0 - 0.2 K/UL    Absolute Immature Granulocyte 0.1 0.0 - 0.5 K/UL   Comprehensive Metabolic Panel    Collection Time: 05/30/22  3:12 PM   Result Value Ref Range    Sodium 138 136 - 145 mmol/L    Potassium 4.3 3.5 - 5.1 mmol/L    Chloride 102 98 - 107 mmol/L    CO2 33 (H) 21 - 32 mmol/L    Anion Gap 3 (L) 7 - 16 mmol/L    Glucose 186 (H) 65 - 100 mg/dL    BUN 14 8 - 23 MG/DL    CREATININE 1.00 0.6 - 1.0 MG/DL    GFR African American >60 >60 ml/min/1.73m2    GFR Non- 56 (L) >60 ml/min/1.73m2    Calcium 9.2 8.3 - 10.4 MG/DL    Total Bilirubin 0.3 0.2 - 1.1 MG/DL    ALT 17 12 - 65 U/L    AST 18 15 - 37 U/L    Alk Phosphatase 90 50 - 136 U/L    Total Protein 7.2 6.3 - 8.2 g/dL    Albumin 3.1 (L) 3.2 - 4.6 g/dL    Globulin 4.1 (H) 2.3 - 3.5 g/dL    Albumin/Globulin Ratio 0.8 (L) 1.2 - 3.5     Urinalysis    Collection Time: 05/30/22  5:03 PM   Result Value Ref Range    Color, UA YELLOW      Appearance CLEAR      Specific Gravity, UA 1.015 1.001 - 1.023      pH, Urine 6.5 5.0 - 9.0      Protein, UA Negative NEG mg/dL    Glucose, UA Negative mg/dL    Ketones, Urine Negative NEG mg/dL    Bilirubin Urine Negative NEG      Blood, Urine Negative NEG      Urobilinogen, Urine 0.2 0.2 - 1.0 EU/dL    Nitrite, Urine Negative NEG      Leukocyte Esterase, Urine Negative NEG POCT Glucose    Collection Time: 05/30/22  9:05 PM   Result Value Ref Range    POC Glucose 165 (H) 65 - 100 mg/dL    Performed by: [email or web address]    Basic Metabolic Panel w/ Reflex to MG    Collection Time: 05/31/22  6:10 AM   Result Value Ref Range    Sodium 140 136 - 145 mmol/L    Potassium 3.6 3.5 - 5.1 mmol/L    Chloride 103 98 - 107 mmol/L    CO2 33 (H) 21 - 32 mmol/L    Anion Gap 4 (L) 7 - 16 mmol/L    Glucose 168 (H) 65 - 100 mg/dL    BUN 15 8 - 23 MG/DL    CREATININE 0.90 0.6 - 1.0 MG/DL    GFR African American >60 >60 ml/min/1.73m2    GFR Non- >60 >60 ml/min/1.73m2    Calcium 9.0 8.3 - 10.4 MG/DL   CBC with Auto Differential    Collection Time: 05/31/22  6:10 AM   Result Value Ref Range    WBC 10.4 4.3 - 11.1 K/uL    RBC 3.46 (L) 4.05 - 5.2 M/uL    Hemoglobin 10.1 (L) 11.7 - 15.4 g/dL    Hematocrit 31.5 (L) 35.8 - 46.3 %    MCV 91.0 79.6 - 97.8 FL    MCH 29.2 26.1 - 32.9 PG    MCHC 32.1 31.4 - 35.0 g/dL    RDW 12.6 11.9 - 14.6 %    Platelets 784 126 - 118 K/uL    MPV 11.2 9.4 - 12.3 FL    nRBC 0.00 0.0 - 0.2 K/uL    Differential Type AUTOMATED      Seg Neutrophils 81 (H) 43 - 78 %    Lymphocytes 10 (L) 13 - 44 %    Monocytes 6 4.0 - 12.0 %    Eosinophils % 2 0.5 - 7.8 %    Basophils 0 0.0 - 2.0 %    Immature Granulocytes 1 0.0 - 5.0 %    Segs Absolute 8.5 (H) 1.7 - 8.2 K/UL    Absolute Lymph # 1.0 0.5 - 4.6 K/UL    Absolute Mono # 0.6 0.1 - 1.3 K/UL    Absolute Eos # 0.2 0.0 - 0.8 K/UL    Basophils Absolute 0.0 0.0 - 0.2 K/UL    Absolute Immature Granulocyte 0.1 0.0 - 0.5 K/UL   POCT Glucose    Collection Time: 05/31/22  7:33 AM   Result Value Ref Range    POC Glucose 172 (H) 65 - 100 mg/dL    Performed by: CrossMistyPCA    POCT Glucose    Collection Time: 05/31/22 11:12 AM   Result Value Ref Range    POC Glucose 188 (H) 65 - 100 mg/dL    Performed by: CrossMistyPCA    POCT Glucose    Collection Time: 05/31/22  4:41 PM   Result Value Ref Range    POC Glucose 193 (H) 65 - 100 mg/dL Performed by: Ariel    POCT Glucose    Collection Time: 05/31/22  9:01 PM   Result Value Ref Range    POC Glucose 178 (H) 65 - 100 mg/dL    Performed by: Veronica Nichols    Collection Time: 06/01/22  7:25 AM   Result Value Ref Range    Crossmatch expiration date 06/04/2022,7828     ABO/Rh AB POSITIVE     Antibody Screen NEG    POCT Glucose    Collection Time: 06/01/22  8:06 AM   Result Value Ref Range    POC Glucose 141 (H) 65 - 100 mg/dL    Performed by: Renzo            Other Studies:    Current Meds:  Current Facility-Administered Medications   Medication Dose Route Frequency    HYDROmorphone HCl PF (DILAUDID) injection 0.5 mg  0.5 mg IntraVENous Q10 Min PRN    oxyCODONE (ROXICODONE) immediate release tablet 5 mg  5 mg Oral Once PRN    prochlorperazine (COMPAZINE) injection 5 mg  5 mg IntraVENous Once PRN    diphenhydrAMINE (BENADRYL) injection 12.5 mg  12.5 mg IntraVENous Once PRN    glucose chewable tablet 16 g  4 tablet Oral PRN    dextrose bolus 10% 125 mL  125 mL IntraVENous PRN    Or    dextrose bolus 10% 250 mL  250 mL IntraVENous PRN    glucagon injection 1 mg  1 mg IntraMUSCular PRN    dextrose 5 % solution  100 mL/hr IntraVENous PRN    lactated ringers infusion   IntraVENous Continuous    sodium chloride flush 0.9 % injection 5-40 mL  5-40 mL IntraVENous 2 times per day    sodium chloride flush 0.9 % injection 5-40 mL  5-40 mL IntraVENous PRN    0.9 % sodium chloride infusion   IntraVENous PRN    tuberculin injection 5 Units  5 Units IntraDERmal Once    HYDROmorphone HCl PF (DILAUDID) injection 1 mg  1 mg IntraVENous Q4H PRN    sodium chloride flush 0.9 % injection 5-40 mL  5-40 mL IntraVENous 2 times per day    sodium chloride flush 0.9 % injection 5-40 mL  5-40 mL IntraVENous PRN    0.9 % sodium chloride infusion   IntraVENous PRN    ondansetron (ZOFRAN-ODT) disintegrating tablet 4 mg  4 mg Oral Q8H PRN    Or    ondansetron (ZOFRAN)

## 2022-06-01 NOTE — CONSULTS
Consult    Patient: Paloma Mccallum MRN: [id number]  SSN: [id number]    YOB: 1935  Age: 81 y.o. Sex: female      Subjective:      Paloma Mccallum is a 81 y.o. female who fell and injured her left hip. She is complaining of some left hip pain as well as some neck pain. She says she has a long history of problems with her neck and back after an accident years ago. He has no problems with her right lower extremity or bilateral upper extremities. She does have a CT of her cervical spine that shows no evidence of any fracture but does have a significant amount of degenerative changes. No past medical history on file. No past surgical history on file.    FAMHX -No history of inflammatory arthritis   Social History     Tobacco Use    Smoking status: Not on file    Smokeless tobacco: Not on file   Substance Use Topics    Alcohol use: Not on file      Current Facility-Administered Medications   Medication Dose Route Frequency Provider Last Rate Last Admin    lidocaine 1 % injection 1 mL  1 mL IntraDERmal Once PRN Madhu Hough MD        acetaminophen (TYLENOL) tablet 1,000 mg  1,000 mg Oral Once Madhu Hough MD        fentaNYL (SUBLIMAZE) injection 100 mcg  100 mcg IntraVENous Once PRN Madhu Hough MD        midazolam PF (VERSED) injection 2 mg  2 mg IntraVENous Once PRN Madhu Hough MD        lactated ringers infusion   IntraVENous Continuous Madhu Hough MD        sodium chloride flush 0.9 % injection 5-40 mL  5-40 mL IntraVENous 2 times per day Madhu Hough MD        sodium chloride flush 0.9 % injection 5-40 mL  5-40 mL IntraVENous PRN Madhu Hough MD        0.9 % sodium chloride infusion   IntraVENous PRN Madhu Hough MD        ceFAZolin (ANCEF) 2000 mg in sterile water 20 mL IV syringe  2,000 mg IntraVENous On Call to 1100 Reji Haddad MD        tuberculin injection 5 Units  5 Units IntraDERmal Once Maurizio Fischer MD   5 Units at 05/31/22 1737    HYDROmorphone HCl PF (DILAUDID) injection 1 mg  1 mg IntraVENous Q4H PRN Carri Ramírez, APRN - CNP   1 mg at 06/01/22 0228    sodium chloride flush 0.9 % injection 5-40 mL  5-40 mL IntraVENous 2 times per day Felix Rosario MD   10 mL at 05/31/22 2057    sodium chloride flush 0.9 % injection 5-40 mL  5-40 mL IntraVENous PRN Felix Rosario MD        0.9 % sodium chloride infusion   IntraVENous PRN Felix Rosario MD        ondansetron (ZOFRAN-ODT) disintegrating tablet 4 mg  4 mg Oral Q8H PRN Felix Rosario MD        Or    ondansetron (ZOFRAN) injection 4 mg  4 mg IntraVENous Q6H PRN Felix Rosario MD   4 mg at 05/31/22 0531    polyethylene glycol (GLYCOLAX) packet 17 g  17 g Oral Daily PRN Felix Rosario MD        acetaminophen (TYLENOL) tablet 650 mg  650 mg Oral Q6H PRN Felix Rosario MD        oxyCODONE (ROXICODONE) immediate release tablet 5 mg  5 mg Oral Q4H PRN Felix Rosario MD   5 mg at 06/01/22 0403    cefTRIAXone (ROCEPHIN) 2000 mg IVPB in NS 50ml minibag  2,000 mg IntraVENous Q24H Felix Rosario MD   Stopped at 05/31/22 2131    azithromycin (ZITHROMAX) 500 mg in sodium chloride 0.9 % 250 mL IVPB ([id number])  500 mg IntraVENous Q24H Felix Rosario MD   Stopped at 05/31/22 2232    hydrALAZINE (APRESOLINE) injection 10 mg  10 mg IntraVENous Q6H PRN Felix Rosario MD        albuterol (PROVENTIL) nebulizer solution 2.5 mg  2.5 mg Nebulization Q6H PRN Felix Rosario MD        aspirin chewable tablet 81 mg  81 mg Oral Daily Felix Rosario MD        atorvastatin (LIPITOR) tablet 20 mg  20 mg Oral Nightly Felix Rosario MD   20 mg at 05/31/22 2057    carvedilol (COREG) tablet 12.5 mg  12.5 mg Oral BID WC Felix Rosario MD   12.5 mg at 06/01/22 0636    diazePAM (VALIUM) tablet 2 mg  2 mg Oral Q12H PRN Felix Rosario MD   2 mg at 06/01/22 0555    Pirfenidone TABS 801 mg (Patient Supplied)  801 mg Oral TID Felix Rosario MD        ferrous sulfate (IRON 325) tablet 325 mg  325 mg Oral Daily with breakfast Jona Wiggins MD        furosemide (LASIX) tablet 40 mg  40 mg Oral BID Jona Wiggins MD   40 mg at 05/31/22 1739    gabapentin (NEURONTIN) capsule 100 mg  100 mg Oral TID Jona Wiggins MD   100 mg at 05/31/22 2057    losartan (COZAAR) tablet 50 mg  50 mg Oral Daily Jona Wiggins MD   50 mg at 05/31/22 3991    pantoprazole (PROTONIX) tablet 40 mg  40 mg Oral QAM AC Jona Wiggins MD   40 mg at 06/01/22 0555    tiotropium (SPIRIVA RESPIMAT) 2.5 MCG/ACT inhaler 2 puff  2 puff Inhalation Daily Jona Wiggins MD   2 puff at 05/31/22 0930    topiramate (TOPAMAX) tablet 25 mg  25 mg Oral BID Jona Wiggins MD   25 mg at 05/31/22 2057    traZODone (DESYREL) tablet 150 mg  150 mg Oral Nightly Jona Wiggins MD   150 mg at 05/31/22 2057    insulin lispro (HUMALOG) injection vial 0-4 Units  0-4 Units SubCUTAneous TID WC Jona Wiggins MD        insulin lispro (HUMALOG) injection vial 0-4 Units  0-4 Units SubCUTAneous Nightly Jona Wiggins MD            Allergies   Allergen Reactions    Carbamazepine     Metronidazole Nausea Only     Other reaction(s): Nausea    Prednisone      Other reaction(s): Rash    Tetanus Toxoid      Other reaction(s): Rash       Review of Systems:  A comprehensive review of systems was negative. Objective:     Vitals:    06/01/22 0258 06/01/22 0340 06/01/22 0405 06/01/22 0433   BP:  (!) 165/80     Pulse:  77     Resp: 18 17  18   Temp:  98.2 °F (36.8 °C)     TempSrc:  Oral     SpO2:  93%     Weight:   174 lb 3.2 oz (79 kg)    Height:            Physical Exam:  Physical Exam:  General:  Alert, cooperative, no distress, appears stated age. Orientation alert and oriented person place time and situation   Eyes:  Conjunctivae/corneas clear. PERRL, EOMs intact. Fundi benign   Ears:  Normal TMs and external ear canals both ears. Nose: Nares normal. Septum midline.  Mucosa normal. No drainage or sinus tenderness. Mouth/Throat: Lips, mucosa, and tongue normal. Teeth and gums normal.   Neck: Supple, symmetrical, trachea midline, no adenopathy, thyroid: no enlargment/tenderness/nodules, no carotid bruit and no JVD. Back:   Symmetric, no curvature. ROM normal. No CVA tenderness. Lungs:   Clear to auscultation bilaterally. Heart:  Regular rate and rhythm, S1, S2 normal, no murmur, click, rub or gallop. Abdomen:   Soft, non-tender. Bowel sounds normal. No masses,  No organomegaly. No lymphadenopathy in all 4 extremities  Alignment- pt has some obvious deformity of the left hip  Range of motion- with any range of motion leftt hip  Vascular-distal pulses palpable in left lower extremity  Sensory/motor-deep tendon reflexes normal left lower extremity. Motor and sensory function intact. Stability- is difficult to assess stability because of the presence of the fracture  Tenderness to palpation over the left greater trochanter area  Skin- no rashes ulcerations or open wounds left lower extremity  Gait- cannot put any weight on the left lower extremity      Assessment:     Hospital Problems           Last Modified POA    * (Principal) Closed left hip fracture ([address] 5/31/2022 Yes    Pulmonary fibrosis (HCC) (Chronic) 5/30/2022 Yes    Chronic respiratory failure with hypoxia (HCC) (Chronic) 5/30/2022 Yes    Pulmonary infiltrate 5/30/2022 Yes    Chronic back pain (Chronic) 5/30/2022 Yes    Opioid dependence ([address] (Chronic) 5/30/2022 Yes    Hypertension (Chronic) 5/30/2022 Yes    Hyperlipidemia (Chronic) 5/30/2022 Yes    Leukocytosis 5/30/2022 Yes    Type 2 diabetes mellitus (HCC) (Chronic) 5/30/2022 Yes         Closed valgus impacted left femoral neck fracture    Xrays and or studies:    X-rays show a valgus impacted left femoral neck fracture  Plan:     I have spoken with the patient regarding different treatment options.   I have explained that with the fact that she has an impacted fracture I think the most reasonable thing will be to proceed with plate and screw fixation of this. Abuse milliradians help understand what this would involve.   After talking her about this and explained the exact nature of the procedure as well as a detailed list of material risk associate with the procedure I think it would be wise to proceed with open treatment left femoral neck fracture with plate and screw fixation today in the operating room    Signed By: Lizet Flores MD

## 2022-06-01 NOTE — PERIOP NOTE
TRANSFER - IN REPORT:    Verbal report received from Vivian De Leon, [facility] on [address]  being received from [phone] for ordered procedure      Report consisted of patient's Situation, Background, Assessment and   Recommendations(SBAR). Information from the following report(s) MAR, Recent Results and Pre Procedure Checklist was reviewed with the receiving nurse. Opportunity for questions and clarification was provided. Assessment completed upon patient's arrival to unit and care assumed.

## 2022-06-01 NOTE — PROGRESS NOTES
TRANSFER - IN REPORT:    Verbal report received from Maurilio Luna on David Hotter  being received from PACU for routine progression of patient care      Report consisted of patient's Situation, Background, Assessment and   Recommendations(SBAR). Information from the following report(s) Nurse Handoff Report, Index, Surgery Report, Intake/Output, MAR and Recent Results was reviewed with the receiving nurse. Opportunity for questions and clarification was provided. Assessment completed upon patient's arrival to unit and care assumed.

## 2022-06-01 NOTE — PROGRESS NOTES
TRANSFER - OUT REPORT:    Verbal report given to Yun Rae on Delpha Ortiz  being transferred to Surgery for ordered procedure       Report consisted of patient's Situation, Background, Assessment and   Recommendations(SBAR). Information from the following report(s) Nurse Handoff Report and MAR was reviewed with the receiving nurse. Lines:   Peripheral IV 06/01/22 Left Forearm (Active)        Opportunity for questions and clarification was provided.       Patient transported with:  O2 @ 3lpm

## 2022-06-02 LAB
ANION GAP SERPL CALC-SCNC: 7 MMOL/L (ref 7–16)
BUN SERPL-MCNC: 22 MG/DL (ref 8–23)
CALCIUM SERPL-MCNC: 8.9 MG/DL (ref 8.3–10.4)
CHLORIDE SERPL-SCNC: 103 MMOL/L (ref 98–107)
CO2 SERPL-SCNC: 24 MMOL/L (ref 21–32)
CREAT SERPL-MCNC: 1 MG/DL (ref 0.6–1)
ERYTHROCYTE [DISTWIDTH] IN BLOOD BY AUTOMATED COUNT: 12.4 % (ref 11.9–14.6)
GLUCOSE BLD STRIP.AUTO-MCNC: 171 MG/DL (ref 65–100)
GLUCOSE BLD STRIP.AUTO-MCNC: 183 MG/DL (ref 65–100)
GLUCOSE BLD STRIP.AUTO-MCNC: 185 MG/DL (ref 65–100)
GLUCOSE BLD STRIP.AUTO-MCNC: 201 MG/DL (ref 65–100)
GLUCOSE SERPL-MCNC: 141 MG/DL (ref 65–100)
HCT VFR BLD AUTO: 28.8 % (ref 35.8–46.3)
HGB BLD-MCNC: 9.6 G/DL (ref 11.7–15.4)
MCH RBC QN AUTO: 29.3 PG (ref 26.1–32.9)
MCHC RBC AUTO-ENTMCNC: 33.3 G/DL (ref 31.4–35)
MCV RBC AUTO: 87.8 FL (ref 79.6–97.8)
MM INDURATION, POC: 0 MM (ref 0–5)
NRBC # BLD: 0 K/UL (ref 0–0.2)
PLATELET # BLD AUTO: 234 K/UL (ref 150–450)
PMV BLD AUTO: 11.7 FL (ref 9.4–12.3)
POTASSIUM SERPL-SCNC: 3.7 MMOL/L (ref 3.5–5.1)
PPD, POC: NEGATIVE
RBC # BLD AUTO: 3.28 M/UL (ref 4.05–5.2)
SERVICE CMNT-IMP: ABNORMAL
SODIUM SERPL-SCNC: 134 MMOL/L (ref 136–145)
WBC # BLD AUTO: 10.2 K/UL (ref 4.3–11.1)

## 2022-06-02 PROCEDURE — 97530 THERAPEUTIC ACTIVITIES: CPT

## 2022-06-02 PROCEDURE — 97535 SELF CARE MNGMENT TRAINING: CPT

## 2022-06-02 PROCEDURE — 97112 NEUROMUSCULAR REEDUCATION: CPT

## 2022-06-02 PROCEDURE — 85027 COMPLETE CBC AUTOMATED: CPT

## 2022-06-02 PROCEDURE — 2580000003 HC RX 258: Performed by: ORTHOPAEDIC SURGERY

## 2022-06-02 PROCEDURE — 97110 THERAPEUTIC EXERCISES: CPT

## 2022-06-02 PROCEDURE — 6370000000 HC RX 637 (ALT 250 FOR IP): Performed by: INTERNAL MEDICINE

## 2022-06-02 PROCEDURE — 80048 BASIC METABOLIC PNL TOTAL CA: CPT

## 2022-06-02 PROCEDURE — 1100000000 HC RM PRIVATE

## 2022-06-02 PROCEDURE — 6370000000 HC RX 637 (ALT 250 FOR IP): Performed by: ORTHOPAEDIC SURGERY

## 2022-06-02 PROCEDURE — 6360000002 HC RX W HCPCS: Performed by: ORTHOPAEDIC SURGERY

## 2022-06-02 PROCEDURE — 94640 AIRWAY INHALATION TREATMENT: CPT

## 2022-06-02 PROCEDURE — 2500000003 HC RX 250 WO HCPCS: Performed by: ORTHOPAEDIC SURGERY

## 2022-06-02 PROCEDURE — 97161 PT EVAL LOW COMPLEX 20 MIN: CPT

## 2022-06-02 PROCEDURE — 36415 COLL VENOUS BLD VENIPUNCTURE: CPT

## 2022-06-02 PROCEDURE — 82962 GLUCOSE BLOOD TEST: CPT

## 2022-06-02 PROCEDURE — 6370000000 HC RX 637 (ALT 250 FOR IP): Performed by: NURSE PRACTITIONER

## 2022-06-02 PROCEDURE — 97166 OT EVAL MOD COMPLEX 45 MIN: CPT

## 2022-06-02 RX ORDER — AZITHROMYCIN 250 MG/1
500 TABLET, FILM COATED ORAL EVERY 24 HOURS
Status: DISCONTINUED | OUTPATIENT
Start: 2022-06-02 | End: 2022-06-03 | Stop reason: HOSPADM

## 2022-06-02 RX ADMIN — FUROSEMIDE 40 MG: 40 TABLET ORAL at 08:45

## 2022-06-02 RX ADMIN — SODIUM CHLORIDE, PRESERVATIVE FREE 10 ML: 5 INJECTION INTRAVENOUS at 08:46

## 2022-06-02 RX ADMIN — MORPHINE SULFATE 15 MG: 15 TABLET, EXTENDED RELEASE ORAL at 21:00

## 2022-06-02 RX ADMIN — FERROUS SULFATE TAB 325 MG (65 MG ELEMENTAL FE) 325 MG: 325 (65 FE) TAB at 08:45

## 2022-06-02 RX ADMIN — ASPIRIN 325 MG: 325 TABLET, COATED ORAL at 08:45

## 2022-06-02 RX ADMIN — INSULIN LISPRO 1 UNITS: 100 INJECTION, SOLUTION INTRAVENOUS; SUBCUTANEOUS at 17:38

## 2022-06-02 RX ADMIN — TRAZODONE HYDROCHLORIDE 150 MG: 50 TABLET ORAL at 21:00

## 2022-06-02 RX ADMIN — ASPIRIN 325 MG: 325 TABLET, COATED ORAL at 21:00

## 2022-06-02 RX ADMIN — ACETAMINOPHEN 650 MG: 325 TABLET ORAL at 17:33

## 2022-06-02 RX ADMIN — GABAPENTIN 100 MG: 100 CAPSULE ORAL at 14:22

## 2022-06-02 RX ADMIN — ACETAMINOPHEN 650 MG: 325 TABLET ORAL at 11:38

## 2022-06-02 RX ADMIN — CARVEDILOL 12.5 MG: 12.5 TABLET, FILM COATED ORAL at 08:45

## 2022-06-02 RX ADMIN — TOPIRAMATE 25 MG: 25 TABLET, FILM COATED ORAL at 08:45

## 2022-06-02 RX ADMIN — ATORVASTATIN CALCIUM 20 MG: 10 TABLET, FILM COATED ORAL at 21:00

## 2022-06-02 RX ADMIN — SALINE NASAL SPRAY 1 SPRAY: 1.5 SOLUTION NASAL at 08:47

## 2022-06-02 RX ADMIN — CARVEDILOL 12.5 MG: 12.5 TABLET, FILM COATED ORAL at 17:33

## 2022-06-02 RX ADMIN — FUROSEMIDE 40 MG: 40 TABLET ORAL at 17:33

## 2022-06-02 RX ADMIN — SODIUM CHLORIDE, PRESERVATIVE FREE 10 ML: 5 INJECTION INTRAVENOUS at 21:03

## 2022-06-02 RX ADMIN — POLYETHYLENE GLYCOL 3350 17 G: 17 POWDER, FOR SOLUTION ORAL at 11:38

## 2022-06-02 RX ADMIN — GABAPENTIN 100 MG: 100 CAPSULE ORAL at 08:45

## 2022-06-02 RX ADMIN — CEFAZOLIN SODIUM 2000 MG: 100 INJECTION, POWDER, LYOPHILIZED, FOR SOLUTION INTRAVENOUS at 02:01

## 2022-06-02 RX ADMIN — MORPHINE SULFATE 15 MG: 15 TABLET, EXTENDED RELEASE ORAL at 14:22

## 2022-06-02 RX ADMIN — TIOTROPIUM BROMIDE INHALATION SPRAY 2 PUFF: 3.12 SPRAY, METERED RESPIRATORY (INHALATION) at 08:16

## 2022-06-02 RX ADMIN — PANTOPRAZOLE SODIUM 40 MG: 40 TABLET, DELAYED RELEASE ORAL at 05:39

## 2022-06-02 RX ADMIN — TOPIRAMATE 25 MG: 25 TABLET, FILM COATED ORAL at 21:00

## 2022-06-02 RX ADMIN — LOSARTAN POTASSIUM 50 MG: 50 TABLET, FILM COATED ORAL at 08:45

## 2022-06-02 RX ADMIN — CEFTRIAXONE 2000 MG: 2 INJECTION, POWDER, FOR SOLUTION INTRAMUSCULAR; INTRAVENOUS at 21:00

## 2022-06-02 RX ADMIN — MORPHINE SULFATE 15 MG: 15 TABLET, EXTENDED RELEASE ORAL at 05:39

## 2022-06-02 RX ADMIN — OXYCODONE AND ACETAMINOPHEN 1 TABLET: 7.5; 325 TABLET ORAL at 08:45

## 2022-06-02 RX ADMIN — DIAZEPAM 2 MG: 2 TABLET ORAL at 00:36

## 2022-06-02 RX ADMIN — GABAPENTIN 100 MG: 100 CAPSULE ORAL at 21:00

## 2022-06-02 RX ADMIN — AZITHROMYCIN MONOHYDRATE 500 MG: 250 TABLET ORAL at 21:00

## 2022-06-02 ASSESSMENT — PAIN SCALES - GENERAL
PAINLEVEL_OUTOF10: 0
PAINLEVEL_OUTOF10: 8
PAINLEVEL_OUTOF10: 0
PAINLEVEL_OUTOF10: 8
PAINLEVEL_OUTOF10: 3
PAINLEVEL_OUTOF10: 0
PAINLEVEL_OUTOF10: 4
PAINLEVEL_OUTOF10: 0
PAINLEVEL_OUTOF10: 8
PAINLEVEL_OUTOF10: 3
PAINLEVEL_OUTOF10: 6
PAINLEVEL_OUTOF10: 8

## 2022-06-02 ASSESSMENT — PAIN SCALES - WONG BAKER
WONGBAKER_NUMERICALRESPONSE: 0
WONGBAKER_NUMERICALRESPONSE: 2

## 2022-06-02 ASSESSMENT — PAIN DESCRIPTION - DESCRIPTORS
DESCRIPTORS: ACHING
DESCRIPTORS: ACHING
DESCRIPTORS: ACHING;SORE

## 2022-06-02 ASSESSMENT — PAIN DESCRIPTION - ORIENTATION
ORIENTATION: LOWER
ORIENTATION: LEFT

## 2022-06-02 ASSESSMENT — PAIN DESCRIPTION - LOCATION
LOCATION: BACK;HEAD;HIP
LOCATION: BACK
LOCATION: BACK;HIP
LOCATION: HEAD

## 2022-06-02 NOTE — PROGRESS NOTES
PHYSICAL THERAPY Daily Note and AM  (Link to Caseload Tracking: PT Visit Days : 2  Time In/Out PT Charge Capture  Rehab Caseload Tracker  Orders      Eduin Salmeron is a 81 y.o. female   PRIMARY DIAGNOSIS: Closed left hip fracture (HCC)  Closed fracture of left hip, initial encounter ([facility] [S72.002A]  Closed [facility]chanteric femur [facility], [facility] [S72.22XA]  Procedure(s) (LRB):  LEFT HIP OPEN REDUCTION INTERNAL FIXATION/ CHOICE/  (Left)  1 Day Post-Op  Inpatient: Payor: MEDICARE / Plan: MEDICARE PART A AND B / Product Type: *No Product type* /     ASSESSMENT:     REHAB RECOMMENDATIONS:   Recommendation to date pending progress:  Setting:   Short-term Rehab    Equipment:     To Be Determined     ASSESSMENT:  Ms. Shoshana Spicer limited by increased pain and confusion this afternoon, requiring mod A for bed mobility and mod-max A to transfer to/from commode with RW. She was able to perform supine therex, but quickly fatigued and resisted additional mobility. Will continue to plan for STR.      SUBJECTIVE:   Ms. Shoshana Spicer states, \"I need to go to the bathroom\"     Social/Functional Lives With: Spouse  Type of Home: House  Home Layout: One level  Home Access: Stairs to enter with rails  ADL Assistance: Needs assistance  Bath: Stand by assistance  OBJECTIVE:     PAIN: VITALS / O2: PRECAUTION / Kayla Mantis / DRAINS:   Pre Treatment:   Pain Assessment: 0-10  Pain Level: 8      Post Treatment: 8   Vitals        Oxygen  O2 Therapy: Oxygen  O2 Device: Nasal cannula  O2 Flow Rate (L/min): 3 L/min IV    RESTRICTIONS/PRECAUTIONS:  Restrictions/Precautions  Restrictions/Precautions: Weight Bearing  Lower Extremity Weight Bearing Restrictions  Left Lower Extremity Weight Bearing: Weight Bearing As Tolerated  Restrictions/Precautions: Weight Bearing     MOBILITY: I Mod I S SBA CGA Min Mod Max Total  NT x2 Comments:   Bed Mobility    Rolling [] [] [] [] [] [] [x] [] [] [] []    Supine to Sit [] [] [] [] [] [] [x] [] [] [] []    Scooting [] [] [] [] [] [] [x] [] [] [] []    Sit to Supine [] [] [] [] [] [] [x] [] [] [] []    Transfers    Sit to Stand [] [] [] [] [] [] [x] [] [] [] []    Bed to Chair [] [] [] [] [] [] [] [x] [] [] []    Stand to Sit [] [] [] [] [] [] [x] [] [] [] []     [] [] [] [] [] [] [] [] [] [] []    I=Independent, Mod I=Modified Independent, S=Supervision, SBA=Standby Assistance, CGA=Contact Guard Assistance,   Min=Minimal Assistance, Mod=Moderate Assistance, Max=Maximal Assistance, Total=Total Assistance, NT=Not Tested    BALANCE: Good Fair+ Fair Fair- Poor NT Comments   Sitting Static [x] [] [] [] [] []    Sitting Dynamic [x] [] [] [] [] []              Standing Static [] [] [x] [] [] []    Standing Dynamic [] [] [] [x] [] []      GAIT: I Mod I S SBA CGA Min Mod Max Total  NT x2 Comments:   Level of Assistance [] [] [] [] [] [] [] [x] [] [] []    Distance 4 feet    DME Rolling Walker    Gait Quality Antalgic, Decreased step clearance, Decreased step length, Decreased stance and Narrow base of support    Weightbearing Status      Stairs      I=Independent, Mod I=Modified Independent, S=Supervision, SBA=Standby Assistance, CGA=Contact Guard Assistance,   Min=Minimal Assistance, Mod=Moderate Assistance, Max=Maximal Assistance, Total=Total Assistance, NT=Not Tested    PLAN:   ACUTE PHYSICAL THERAPY GOALS:   (Developed with and agreed upon by patient and/or caregiver.)  1. Pt will perform bed mobility with Min (A), inc time and use of rails in 7 therapy sessions. 2. Pt will perform sit-to-stand/ stand-to-sit transfers CG (A) c use of LRAD and gait belt in 7 therapy sessions. 3. Pt will ambulate 125 ft CG (A) with use of LRAD and breaks as needed in 7 therapy sessions. 4. Pt will perform standing dynamic balance activities with minimal postural sway in 7 therapy sessions.   5. Pt will tolerate multiple sets and reps of BLE exercises in 7 therapy sessions.       FREQUENCY AND DURATION: BID for duration of hospital stay or until stated goals are met, whichever comes first.      TREATMENT:   TREATMENT:   Therapeutic Activity (15 Minutes): Therapeutic activity included Rolling, Supine to Sit, Sit to Supine, Scooting, Lateral Scooting, Transfer Training, Ambulation on level ground, Sitting balance  and Standing balance to improve functional Activity tolerance, Balance, Coordination, Mobility and Strength. Therapeutic Exercise (10 Minutes): Therapeutic exercises noted below to improve functional activity tolerance, AROM, strength and mobility.      TREATMENT GRID:   Date:  6/2 Date:   Date:     Activity/Exercise Parameters Parameters Parameters   Ankle pumps 10     Quad set 10     LAQ 10     SAQ 10     Hip abduction 10     Glut sets 10                 AFTER TREATMENT PRECAUTIONS: Alarm Activated, Bed, Bed/Chair Locked, Call light within reach, Heels floated, Needs within reach and RN notified    INTERDISCIPLINARY COLLABORATION:  RN/ PCT, PT/ PTA and OT/ HAMMOND    EDUCATION:      TIME IN/OUT:  Time In: 1450  Time Out: 52 [facility]  Minutes: [address], PT

## 2022-06-02 NOTE — PROGRESS NOTES
Dispo update:  Spoke to Ms. Haji, her , and their daughter in room 604 about discharge planning. PT and OT are recommending STR at a SNF. Provided them the list.  However, they do not want SNF, but agreed to home health OT and PT. They prefer [facility] health. Order and referral placed into Epic, and confirmed with Ms. Nathaly Da Silva, RN liaison for [facility].

## 2022-06-02 NOTE — PROGRESS NOTES
Hospitalist Progress Note   Admit Date:  2022  2:55 PM   Name:  Jose Bruno   Age:  81 y.o. Sex:  female  :  1935   MRN:  [id number]   Room:  604/01    Presenting Complaint: Fall     Reason(s) for Admission: Closed fracture of left hip, initial encounter [facility]) [S72.002A]  Closed left subtrochanteric femur fracture, initial encounter [facility]) [facility] Course & Interval History:   Jorje Piña an 81 y.o. female with a PMH of pulmonary fibrosis, chronic hypoxic respiratory failure on 2 L NC, HTN, chronic lower back pain taking opioids, DM2 who is evaluated after a fall while tripping over a rug in the bathroom. She did ambulate to her bed but due to increased pain she presented to the ER.      XR L femur shows inability to exclude nondisplaced left femur fracture that is confirmed on CT hip. CT head negative. CXR shows left lower infiltrate. She is followed by [facility] pulmonary for fibrosis.     She has had prior surgery with tolerance to anesthesia. EKG tracing showing NSR     She was admitted to the Hospitalist service with consult placed to Ortho surgery. She was taken to the OR with Dr. Keara Swanson on . Subjective/24hr Events (22): No AEO. The patient feels her breathing is at baseline. She c/o hip and back pain. She will work with PT/OT today. We anticipate a need for STR in 1-3 days. Assessment & Plan:     Principal Problem:  Closed L subtrochanteric femur fracture, initial encounter ([facility]  POD 1, L HIP OPEN REDUCTION INTERNAL FIXATION  - Ortho surgery on board  - DVT ppx per Ortho  - Control pain  - PT/OT pending   - Follow up with Dr. Keara Swanson post discharge  - Remove Cho     Active Problems:  Pulmonary fibrosis ([facility]  Chronic respiratory failure with hypoxia (HCC)  Pulmonary infiltrate  -2 L NC baseline   -[facility]   -Rocephin and Azithromycin due to LLL infiltrate.  5 days total treatment    -Continued spiriva, esbrief, as needed albuterol  -WBC now wnl     Chronic back pain  Opioid dependence ([facility]  -She takes Percocet 7.5 mg for breakthrough pain and morphine ER 15 mg every 8 hours at home  22  -Restarting home meds. Verified on [facility] website. Patient states pain was not controlled with new regimen     Hypertension  -Coreg, losartan  -PRN IV hydralazine   -Control pain     Hyperlipidemia  -Lipitor      DM2  -Accu checks with sliding scale insulin     Dysphagia  -Patient endorses long history  -SLP recommends regular and thin      Discharge Plannin-3 midnights      Diet:  ADULT DIET; Regular  DVT PPx: ASA per Ortho  Code status: Full Code    Hospital Problems:  Principal Problem:    Closed left hip fracture (HCC)  Active Problems:    Pulmonary fibrosis (HCC)    Chronic respiratory failure with hypoxia (HCC)    Pulmonary infiltrate    Chronic back pain    Opioid dependence ([facility]    Hypertension    Hyperlipidemia    Leukocytosis    Type 2 diabetes mellitus ([facility]  Resolved Problems:    * No resolved hospital problems.  *      Objective:     Patient Vitals for the past 24 hrs:   Temp Pulse Resp BP SpO2   22 0915 -- -- 18 -- --   22 0845 -- -- 18 -- --   22 0719 98.4 °F (36.9 °C) 66 19 (!) 176/78 97 %   22 0609 -- -- 17 -- --   22 0241 97.9 °F (36.6 °C) 62 17 (!) 169/72 99 %   22 2314 98.7 °F (37.1 °C) 82 17 (!) 148/63 --   22 2115 -- -- 17 -- --   22 1916 97.8 °F (36.6 °C) 69 16 (!) 137/48 97 %   22 1647 -- -- 16 -- --   22 1616 98 °F (36.7 °C) -- 14 -- --   22 1501 (!) 96.2 °F (35.7 °C) 77 12 (!) 163/67 100 %   22 1246 -- -- 18 -- --   22 1216 -- -- 18 -- --   22 1155 97.9 °F (36.6 °C) 68 -- (!) 180/78 95 %       Oxygen Therapy  SpO2: 97 %  Pulse Oximeter Device Mode: Continuous  Pulse Oximeter Device Location: Left,Hand  O2 Device: Nasal cannula  O2 Flow Rate (L/min): 3 L/min    Estimated body mass index is 28.12 kg/m² as calculated from the following: Height as of this encounter: 5' 6\" (1.676 m). Weight as of this encounter: 174 lb 3.2 oz (79 kg). Intake/Output Summary (Last 24 hours) at 6/2/2022 1110  Last data filed at 6/2/2022 0313  Gross per 24 hour   Intake 309 ml   Output 2750 ml   Net -2441 ml         Physical Exam:   Blood pressure (!) 176/78, pulse 66, temperature 98.4 °F (36.9 °C), temperature source Oral, resp. rate 18, height 5' 6\" (1.676 m), weight 174 lb 3.2 oz (79 kg), SpO2 97 %. General:    No overt distress  Head:  Normocephalic, atraumatic  Eyes:  Sclerae appear normal.  Pupils equally round. ENT:  Nares appear normal, no drainage. Moist oral mucosa  Neck:  No restricted ROM. Trachea midline   CV:   RRR. No m/r/g. Lungs: No wheezing. Respirations even, unlabored on 3 L. Abdomen:   Soft, nondistended. Extremities: No cyanosis or clubbing. Skin:     No rashes and normal coloration. Neuro:  CN II-XII grossly intact. A&Ox3  Psych:  Normal mood and affect.       I have reviewed ordered lab tests and independently visualized imaging below:    Recent Labs:  Recent Results (from the past 48 hour(s))   POCT Glucose    Collection Time: 05/31/22 11:12 AM   Result Value Ref Range    POC Glucose 188 (H) 65 - 100 mg/dL    Performed by: [employer]    POCT Glucose    Collection Time: 05/31/22  4:41 PM   Result Value Ref Range    POC Glucose 193 (H) 65 - 100 mg/dL    Performed by: TravolveryPCA    POCT Glucose    Collection Time: 05/31/22  9:01 PM   Result Value Ref Range    POC Glucose 178 (H) 65 - 100 mg/dL    Performed by: Veronica 7    Collection Time: 06/01/22  7:25 AM   Result Value Ref Range    Crossmatch expiration date 06/04/2022,2352     ABO/Rh AB POSITIVE     Antibody Screen NEG    POCT Glucose    Collection Time: 06/01/22  8:06 AM   Result Value Ref Range    POC Glucose 141 (H) 65 - 100 mg/dL    Performed by: [facility]    POCT Glucose    Collection Time: 06/01/22 11:54 AM   Result Value Ref Range    POC Glucose 182 (H) 65 - 100 mg/dL    Performed by: Ariel    POCT Glucose    Collection Time: 06/01/22  3:12 PM   Result Value Ref Range    POC Glucose 291 (H) 65 - 100 mg/dL    Performed by: David    POCT Glucose    Collection Time: 06/01/22  5:12 PM   Result Value Ref Range    POC Glucose 269 (H) 65 - 100 mg/dL    Performed by: Ariel    POCT Glucose    Collection Time: 06/01/22  8:13 PM   Result Value Ref Range    POC Glucose 258 (H) 65 - 100 mg/dL    Performed by: Fahad Hernandez    Basic Metabolic Panel    Collection Time: 06/02/22  5:09 AM   Result Value Ref Range    Sodium 134 (L) 136 - 145 mmol/L    Potassium 3.7 3.5 - 5.1 mmol/L    Chloride 103 98 - 107 mmol/L    CO2 24 21 - 32 mmol/L    Anion Gap 7 7 - 16 mmol/L    Glucose 141 (H) 65 - 100 mg/dL    BUN 22 8 - 23 MG/DL    CREATININE 1.00 0.6 - 1.0 MG/DL    GFR African American >60 >60 ml/min/1.73m2    GFR Non- 56 (L) >60 ml/min/1.73m2    Calcium 8.9 8.3 - 10.4 MG/DL   POCT Glucose    Collection Time: 06/02/22  7:49 AM   Result Value Ref Range    POC Glucose 171 (H) 65 - 100 mg/dL    Performed by: Ariel    CBC    Collection Time: 06/02/22 10:14 AM   Result Value Ref Range    WBC 10.2 4.3 - 11.1 K/uL    RBC 3.28 (L) 4.05 - 5.2 M/uL    Hemoglobin 9.6 (L) 11.7 - 15.4 g/dL    Hematocrit 28.8 (L) 35.8 - 46.3 %    MCV 87.8 79.6 - 97.8 FL    MCH 29.3 26.1 - 32.9 PG    MCHC 33.3 31.4 - 35.0 g/dL    RDW 12.4 11.9 - 14.6 %    Platelets 869 342 - 695 K/uL    MPV 11.7 9.4 - 12.3 FL    nRBC 0.00 0.0 - 0.2 K/uL       Other Studies:  XR HIP LEFT (2-3 VIEWS)    Result Date: 6/1/2022  EXAMINATION: XR HIP LEFT (2-3 VIEWS) 6/1/2022 11:17 AM ACCESSION NUMBER: [id number] COMPARISON: Left hip CT 5/30/2022, left femur x-rays 5/30/2022 INDICATION: Postop TECHNIQUE: A single view of the pelvis and 2 views of the left hip were obtained. FINDINGS: Interval internal fixation of the known left femoral neck fracture.  Fracture fragments are in anatomic alignment. Unremarkable immediate postoperative appearance of of the hardware. Expected subcutaneous emphysema and surgical skin staples. A catheter overlies the central pelvis, likely in the urinary bladder. No pelvic fracture. Mild-to-moderate right hip joint degenerative change. Lower lumbar spine spondylosis. Unremarkable immediate postoperative appearance of a left proximal femoral internal fixation across a known left femoral neck fracture. NC XR TECHNOLOGIST SERVICE    Result Date: 6/1/2022  Radiology exam is complete. No Radiologist dictation. Please follow up with ordering provider.        Current Meds:  Current Facility-Administered Medications   Medication Dose Route Frequency    azithromycin (ZITHROMAX) tablet 500 mg  500 mg Oral Q24H    sodium chloride flush 0.9 % injection 5-40 mL  5-40 mL IntraVENous 2 times per day    sodium chloride flush 0.9 % injection 5-40 mL  5-40 mL IntraVENous PRN    0.9 % sodium chloride infusion   IntraVENous PRN    ondansetron (ZOFRAN-ODT) disintegrating tablet 4 mg  4 mg Oral Q8H PRN    Or    ondansetron (ZOFRAN) injection 4 mg  4 mg IntraVENous Q6H PRN    aspirin EC tablet 325 mg  325 mg Oral BID    oxyCODONE-acetaminophen (PERCOCET) 7.5-325 MG per tablet 1 tablet  1 tablet Oral Q12H PRN    morphine (MS CONTIN) extended release tablet 15 mg  15 mg Oral q8h    sodium chloride (OCEAN, BABY AYR) 0.65 % nasal spray 1 spray  1 spray Each Nostril Q6H PRN    polyethylene glycol (GLYCOLAX) packet 17 g  17 g Oral Daily PRN    acetaminophen (TYLENOL) tablet 650 mg  650 mg Oral Q6H PRN    cefTRIAXone (ROCEPHIN) 2000 mg IVPB in NS 50ml minibag  2,000 mg IntraVENous Q24H    hydrALAZINE (APRESOLINE) injection 10 mg  10 mg IntraVENous Q6H PRN    albuterol (PROVENTIL) nebulizer solution 2.5 mg  2.5 mg Nebulization Q6H PRN    atorvastatin (LIPITOR) tablet 20 mg  20 mg Oral Nightly    carvedilol (COREG) tablet 12.5 mg  12.5 mg Oral BID   diazePAM (VALIUM) tablet 2 mg  2 mg Oral Q12H PRN    Pirfenidone TABS 801 mg (Patient Supplied)  801 mg Oral TID    ferrous sulfate (IRON 325) tablet 325 mg  325 mg Oral Daily with breakfast    furosemide (LASIX) tablet 40 mg  40 mg Oral BID    gabapentin (NEURONTIN) capsule 100 mg  100 mg Oral TID    losartan (COZAAR) tablet 50 mg  50 mg Oral Daily    pantoprazole (PROTONIX) tablet 40 mg  40 mg Oral QAM AC    tiotropium (SPIRIVA RESPIMAT) 2.5 MCG/ACT inhaler 2 puff  2 puff Inhalation Daily    topiramate (TOPAMAX) tablet 25 mg  25 mg Oral BID    traZODone (DESYREL) tablet 150 mg  150 mg Oral Nightly    insulin lispro (HUMALOG) injection vial 0-4 Units  0-4 Units SubCUTAneous TID WC    insulin lispro (HUMALOG) injection vial 0-4 Units  0-4 Units SubCUTAneous Nightly       Signed:  JAVIER Landeros - CNP    Part of this note may have been written by using a voice dictation software. The note has been proof read but may still contain some grammatical/other typographical errors.

## 2022-06-02 NOTE — PROGRESS NOTES
PHYSICAL THERAPY Initial Assessment, Daily Note and AM  (Link to Caseload Tracking: PT Visit Days : 1  Acknowledge Orders  Time In/Out  PT Charge Capture  Rehab Caseload Tracker    Kathleen Galeana is a 81 y.o. female   PRIMARY DIAGNOSIS: Closed left hip fracture (HCC)  Closed fracture of left hip, [facility] [S72.002A]  Closed left subtrochanteric femur fracture, initial encounter ([facility] [S72.22XA]  Procedure(s) (LRB):  LEFT HIP OPEN REDUCTION INTERNAL FIXATION/ CHOICE/  (Left)  1 Day Post-Op  Reason for Referral: Pain in left hip (M25.552)  Stiffness of Left Hip, Not elsewhere classified (M25.652)  Difficulty in walking, Not elsewhere classified (R26.2)  Other abnormalities of gait and mobility (R26.89)  Inpatient: Payor: MEDICARE / Plan: MEDICARE PART A AND B / Product Type: *No Product type* /     ASSESSMENT:     REHAB RECOMMENDATIONS:   Recommendation to date pending progress:  Setting:   Short-term Rehab    Equipment:     None   To Be Determined     ASSESSMENT:  Ms. Hector Pride Is a 81 y.o. female presenting to PT POD 1 s/p L hip ORIF; she is now WBAT LLE. PTA pt lives c her  in a single level home c 2 [place] where she is typically functionally (I) for ADLs and uses RW/rollator for mobility needs. At time of initial evaluation, pt presents below baseline LOF with deficits in bed mobility, strength, transfers, balance, gait and activity tolerance limiting her overall functional mobility. Today, pt performed bed mobility with Mod (A) while requiring Min (A)x2 for sito-stand and Min (A) for brief ambulation c use of RW/ gait belt. Of note, pt has significant history of chronic back pain which contributes to delayed pace of all activity/ transitions; pt c/o back pain more than hip pain throughout session.  On her feet, pt able to ambulate 5-6 slow, shuffling steps c dec flaco, inc postural sway and fair/ fair (-) AD negotiation; she had no major LOB/ miss-steps during the short distance although she complained of notable pain throughout. Pt performed all activity on 3L NC. At this time, pt is an appropriate candidate for skilled PT and will benefit from POC designed to address the aforementioned deficits. Upon completion of treatment, pt was positioned to comfort in chair with needs in reach, alarm activated and  at bedside. RN was made aware of pt performance.      DC Recommendation: STR     [facility] 6 Clicks Basic Mobility Inpatient Short Form  AM-PAC Mobility Inpatient   How much difficulty turning over in bed?: A Lot  How much difficulty sitting down on / standing up from a chair with arms?: A Lot  How much difficulty moving from lying on back to sitting on side of bed?: A Lot  How much help from another person moving to and from a bed to a chair?: A Lot  How much help from another person needed to walk in hospital room?: A Lot  How much help from another person for climbing 3-5 steps with a railing?: Total  AM-PAC Inpatient Mobility Raw Score : 11  AM-PAC Inpatient T-Scale Score : 33.86  Mobility Inpatient CMS 0-100% Score: 72.57  Mobility Inpatient CMS G-Code Modifier : CL    SUBJECTIVE:   Ms. Vikki Solis states, \"My back hurts so bad\"     Social/Functional Lives With: Spouse  Type of Home: House  Home Layout: One level  Home Access: Stairs to enter with rails  ADL Assistance: Needs assistance  [place]ath: Stand by assistance    OBJECTIVE:     PAIN: VITALS / O2: PRECAUTION / Leighton Thomas / DRAINS:   Pre Treatment:   Pain Assessment: 0-10  Pain Level: 8      Post Treatment: 8 Vitals        Oxygen  O2 Device: Nasal cannula  O2 Flow Rate (L/min): 3 L/min   Cho Catheter    RESTRICTIONS/PRECAUTIONS:  Restrictions/Precautions: Weight Bearing  Left Lower Extremity Weight Bearing: Weight Bearing As Tolerated              GROSS EVALUATION: Intact Impaired (Comments):   AROM []  Limited trunk rotation and LLE   PROM [] NT   Strength []  Significant LLE deficits d/t post op status; RLE [facility]   Balance [] unsteady inititally in sitting/ standing but improves c time   Posture [] Forward Head  Rounded Shoulders   Sensation []  NT   Coordination []   WNL   Tone []  NT   Edema []    Activity Tolerance []  Well below baseline level    []      COGNITION/  PERCEPTION: Intact Impaired (Comments):   Orientation []  intermittent confusion/ disorientation to situation/ place throughout    Vision [x]   Not Formally Assessed    Hearing [x]   Not Formally Assessed    Cognition  []  see orientation     MOBILITY: I Mod I S SBA CGA Min Mod Max Total  NT x2 Comments:   Bed Mobility    Rolling [] [] [] [] [] [] [x] [] [] [] []    Supine to Sit [] [] [] [] [] [] [x] [] [] [] [] x1-2   Scooting [] [] [] [] [] [] [x] [] [] [] []    Sit to Supine [] [] [] [] [] [] [] [] [] [] []    Transfers    Sit to Stand [] [] [] [] [] [x] [] [] [] [] [x]    Bed to Chair [] [] [] [] [] [x] [] [] [] [] []    Stand to Sit [] [] [] [] [] [x] [] [] [] [] [x]     [] [] [] [] [] [] [] [] [] [] []    I=Independent, Mod I=Modified Independent, S=Supervision, SBA=Standby Assistance, CGA=Contact Guard Assistance,   Min=Minimal Assistance, Mod=Moderate Assistance, Max=Maximal Assistance, Total=Total Assistance, NT=Not Tested    GAIT: I Mod I S SBA CGA Min Mod Max Total  NT x2 Comments:   Level of Assistance [] [] [] [] [] [x] [] [] [] [] []    Distance 5' feet    DME Gait Belt and Rolling Walker    Gait Quality Decreased flaco , Decreased step clearance, Decreased step length, Shuffling , Step-to and Trunk sway increased    Weightbearing Status Restrictions/Precautions  Restrictions/Precautions: Weight Bearing  Lower Extremity Weight Bearing Restrictions  Left Lower Extremity Weight Bearing: Weight Bearing As Tolerated    Stairs      I=Independent, Mod I=Modified Independent, S=Supervision, SBA=Standby Assistance, CGA=Contact Guard Assistance,   Min=Minimal Assistance, Mod=Moderate Assistance, Max=Maximal Assistance, Total=Total Assistance, NT=Not Tested    PLAN:   ACUTE PHYSICAL THERAPY GOALS:   (Developed with and agreed upon by patient and/or caregiver.)  1. Pt will perform bed mobility with Min (A), inc time and use of rails in 7 therapy sessions. 2. Pt will perform sit-to-stand/ stand-to-sit transfers CG (A) c use of LRAD and gait belt in 7 therapy sessions. 3. Pt will ambulate 125 ft CG (A) with use of LRAD and breaks as needed in 7 therapy sessions. 4. Pt will perform standing dynamic balance activities with minimal postural sway in 7 therapy sessions. 5. Pt will tolerate multiple sets and reps of BLE exercises in 7 therapy sessions. FREQUENCY AND DURATION: BID for duration of hospital stay or until stated goals are met, whichever comes first.    THERAPY PROGNOSIS: Good    PROBLEM LIST:   (Skilled intervention is medically necessary to address:)  Decreased ADL/Functional Activities  Decreased Activity Tolerance  Decreased AROM/PROM  Decreased Balance  Decreased Cognition  Decreased Gait Ability  Decreased Safety Awareness  Decreased Strength  Decreased Transfer Abilities  Increased Pain INTERVENTIONS PLANNED:   (Benefits and precautions of physical therapy have been discussed with the patient.)  Self Care Training  Therapeutic Activity  Therapeutic Exercise/HEP  Neuromuscular Re-education  Gait Training  Education       TREATMENT:   EVALUATION: LOW COMPLEXITY: (Untimed Charge)    TREATMENT:   Therapeutic Activity (25 Minutes): Therapeutic activity included Rolling, Supine to Sit, Scooting, Lateral Scooting, Transfer Training, Ambulation on level ground, Sitting balance  and Standing balance to improve functional Activity tolerance, Balance, Coordination, Mobility, Strength and ROM.     TREATMENT GRID:  N/A    AFTER TREATMENT PRECAUTIONS: Alarm Activated, Chair, Needs within reach, RN notified and Visitors at bedside    INTERDISCIPLINARY COLLABORATION:  RN/ PCT, PT/ PTA and OT/ HAMMOND    EDUCATION: Education Given To: Patient  Education Provided: Role of Therapy    TIME IN/OUT:  Time In: 1032  Time Out: [phone]  Minutes: 35    Antone Homans, PT

## 2022-06-02 NOTE — PROGRESS NOTES
OCCUPATIONAL THERAPY Initial Assessment, Daily Note and AM       OT Visit Days: 1  Acknowledge Orders  Time  OT Charge Capture  Rehab Caseload Tracker      Merna Mccabe is a 81 y.o. female   PRIMARY DIAGNOSIS: Closed left hip fracture (HCC)  Closed fracture of left hip, initial encounter ([facility] [S72.002A]  Closed left subtrochanteric femur fracture, initial encounter ([facility] [S72.22XA]  Procedure(s) (LRB):  LEFT HIP OPEN REDUCTION INTERNAL FIXATION/ CHOICE/  (Left)  1 Day Post-Op  Reason for Referral: Generalized Muscle Weakness (M62.81)  Other lack of cordination (R27.8)  History of falling (Z91.81)  Low Back Pain (M54.5)  Inpatient: Payor: MEDICARE / Plan: MEDICARE PART A AND B / Product Type: *No Product type* /     ASSESSMENT:     REHAB RECOMMENDATIONS:   Recommendation to date pending progress:  Setting:   Short-term Rehab    Equipment:     To Be Determined     ASSESSMENT:  Ms. Deisy Brumfield presents to the hospital after falling at home and sustaining L hip fx. Pt is s/p L ORIF and is WBAT to the L LE. Pt appears confused upon arrival and initially not able to identify her spouse and appears disoriented to place/situation. Pt became more alert during session and less confused than beginning of session. Pt has chronic back pain and is on a routine of pain medications at home per spouse. Pt reports 8/10 pain in her back/hip. Pt required moderate assistance x 2 for bed mobility, standing to the walker, and taking steps over to the recliner chair. Pt did better than expected considering pain levels. Pt positioned up in chair with all needs at bedside. Pt able to hold cup for drinking throughout and was able to comb her own hair but with complaints and asking spouse for help. Recommend rehab at this time but spouse did mention he was planning to take her home and that he also has support from their daughter.  Pt is currently functioning below baseline and will benefit from OT services to address stated goals and plan of care. [facility] 6 [facility]licks Daily Activity Inpatient Short Form:    AM-PAC Daily Activity Inpatient   How much help for putting on and taking off regular lower body clothing?: A Lot  How much help for Bathing?: A Lot  How much help for Toileting?: Total  How much help for putting on and taking off regular upper body clothing?: A Lot  How much help for taking care of personal grooming?: A Little  How much help for eating meals?: A Little  AM-[facility] Inpatient Daily Activity Raw Score: 13  AM-PAC Inpatient ADL T-Scale Score : 32.03  ADL Inpatient CMS 0-100% Score: 63.03  ADL Inpatient CMS G-Code Modifier : CL           SUBJECTIVE:     Ms. Jennifer Boothe states, \"My back hurts! \"     Social/Functional Lives With: Spouse  Type of Home: House  Home Layout: One level  Home Access: Stairs to enter with rails  ADL Assistance: Needs assistance  Bath: Stand by assistance  Pt lives at home with spouse; 1 level with ramp available to enter home; pt uses RW/rollator at baseline; 1 fall; pt was mostly mod I for ADL with occasional assistance with LB dressing; tub/shower with grab bars and transfer bench?   OBJECTIVE:     Clarita Shameka / Chele Flurry / AIRWAY: Cho Catheter    RESTRICTIONS/PRECAUTIONS:  Restrictions/Precautions: Weight Bearing  Left Lower Extremity Weight Bearing: Weight Bearing As Tolerated    PAIN: VITALS / O2:   Pre Treatment:   Pain Assessment: 0-10  Pain Level: 8  Pain Location: Back,Hip  Non-Pharmaceutical Pain Intervention(s): Repositioned      Post Treatment: same     Vitals   Vitals  BP: (!) 152/78  BP Location: Left upper arm      Oxygen            GROSS EVALUATION: INTACT IMPAIRED   (See Comments)   UE AROM [] [x]generally decreased    UE PROM [] []   Strength []   generally decreased      Posture / Balance [] Posture: Fair  Sitting - Static: Fair  Sitting - Dynamic: Fair,-  Standing - Static: Fair,-  Standing - Dynamic: Fair,-   Sensation []     Coordination []  generally decreased     Tone [] Edema [x] NA    Activity Tolerance [] Patient limited by pain,Treatment limited secondary to decreased cognition     Hand Dominance R [] L []      COGNITION/  PERCEPTION: INTACT IMPAIRED   (See Comments)   Orientation [] Oriented to person,Disoriented to place,Disoriented to situation   Vision []  appears functional   Hearing []  appears functional    Cognition  [] Overall Cognitive Status: Exceptions  Arousal/Alertness: Delayed responses to stimuli  Following Commands:  Follows one step commands with repetition  Memory: Decreased recall of recent events  Insights: Decreased awareness of deficits   Perception [] Perception  Overall Perceptual Status: WFL     MOBILITY: I Mod I S SBA CGA Min Mod Max Total  NT x2 Comments:   Bed Mobility    Rolling [] [] [] [] [] [] [x] [] [] [] [x]    Supine to Sit [] [] [] [] [] [] [x] [x] [] [] [x]    Scooting [] [] [] [] [] [] [x] [] [] [] [x]    Sit to Supine [] [] [] [] [] [] [] [] [] [x] []    Transfers    Sit to Stand [] [] [] [] [] [] [x] [] [] [] [x]    Bed to Chair [] [] [] [] [] [] [x] [] [] [] [x] Using RW   Stand to Sit [] [] [] [] [] [] [x] [] [] [] [x]    Tub/Shower [] [] [] [] [] [] [] [] [] [x] []     Toilet [] [] [] [] [] [] [] [] [] [x] []      [] [] [] [] [] [] [] [] [] [] []    I=Independent, Mod I=Modified Independent, S=Supervision/Setup, SBA=Standby Assistance, CGA=Contact Guard Assistance, Min=Minimal Assistance, Mod=Moderate Assistance, Max=Maximal Assistance, Total=Total Assistance, NT=Not Tested    ACTIVITIES OF DAILY LIVING: I Mod I S SBA CGA Min Mod Max Total NT Comments   BASIC ADLs:              Bathing/ Showering [] [] [] [] [] [] [] [] [] [x]    Toileting [] [] [] [] [] [] [] [] [] [x]    Upper Body Dressing [] [] [] [] [] [] [] [] [] [x]    Lower Body Dressing [] [] [] [] [] [] [] [] [x] [] Donning socks   Feeding [] [] [] [x] [] [] [] [] [] [] Holding cups   Grooming [] [] [] [] [] [x] [] [] [] [] Combing hair   Personal Device Care [] [] [] [] [] [] [] [] [] [x]    Functional Mobility [] [] [] [] [] [] [x] [] [] [] X 2 RW   I=Independent, Mod I=Modified Independent, S=Supervision/Setup, SBA=Standby Assistance, CGA=Contact Guard Assistance, Min=Minimal Assistance, Mod=Moderate Assistance, Max=Maximal Assistance, Total=Total Assistance, NT=Not Tested    PLAN:     [facility] Care: 5 times/week for duration of hospital stay or until stated goals are met, whichever comes first.    ACUTE OCCUPATIONAL THERAPY GOALS:   (Developed with and agreed upon by patient and/or caregiver.)  1. Patient will complete upper body bathing and dressing with SBA and adaptive equipment as needed. 2. Patient will complete toileting with moderate assistance to improve independence with self-care. 3. Patient will tolerate 25 minutes of OT treatment with 2-3 rest breaks to increase activity tolerance for ADLs. 4. Patient will complete functional transfers to chair/BSC with minimal assistance and adaptive equipment as needed. 5. Patient will complete functional mobility for short household distances with minimal assistance and appropriate safety awareness. 6. Patient will complete bed mobility with minimal assistance to improve bed mobility to improve positioning for ADL.      Timeframe: 7 visits         PROBLEM LIST:   (Skilled intervention is medically necessary to address:)  Decreased ADL/Functional Activities  Decreased Activity Tolerance  Decreased AROM/PROM  Decreased Balance  Decreased Cognition  Decreased Coordination  Decreased Gait Ability  Decreased Safety Awareness  Decreased Strength  Decreased Transfer Abilities  Increased Pain   INTERVENTIONS PLANNED:  (Benefits and precautions of occupational therapy have been discussed with the patient.)  Self Care Training  Therapeutic Activity  Therapeutic Exercise/HEP  Neuromuscular Re-education  Manual Therapy  Education         TREATMENT:     EVALUATION: MODERATE COMPLEXITY: (Untimed Charge)    TREATMENT: Co-Treatment PT/OT necessary due to patient's decreased overall endurance/tolerance levels, as well as need for high level skilled assistance to complete functional transfers/mobility and functional tasks  Self Care: (8): Procedure(s) (per grid) utilized to improve and/or restore self-care/home management as related to grooming and self feeding. Required minimal visual, verbal and tactile cueing to facilitate activities of daily living skills and compensatory activities. Neuromuscular Re-education (15 Minutes): Neuromuscular Re-education included Balance Training, Coordination training, Postural training, Sitting balance training and Standing balance training to improve Balance, Coordination and Postural Control.     TREATMENT GRID:  N/A    AFTER TREATMENT PRECAUTIONS: Alarm Activated, Call light within reach, Chair, Needs within reach and RN notified    INTERDISCIPLINARY COLLABORATION:  RN/ PCT, PT/ PTA and OT/ HAMMOND    EDUCATION:       TOTAL TREATMENT DURATION AND TIME:  Time In: 1032  Time Out: 300 [facility]  Minutes: Jaja. Bhavesh [address] [place] [address]

## 2022-06-03 ENCOUNTER — HOME HEALTH ADMISSION (OUTPATIENT)
Dept: HOME HEALTH SERVICES | Facility: HOME HEALTH | Age: 87
End: 2022-06-03
Payer: MEDICARE

## 2022-06-03 VITALS
WEIGHT: 174.2 LBS | DIASTOLIC BLOOD PRESSURE: 62 MMHG | TEMPERATURE: 98.2 F | HEART RATE: 76 BPM | RESPIRATION RATE: 18 BRPM | OXYGEN SATURATION: 98 % | SYSTOLIC BLOOD PRESSURE: 147 MMHG | BODY MASS INDEX: 28 KG/M2 | HEIGHT: 66 IN

## 2022-06-03 LAB
GLUCOSE BLD STRIP.AUTO-MCNC: 168 MG/DL (ref 65–100)
GLUCOSE BLD STRIP.AUTO-MCNC: 213 MG/DL (ref 65–100)
SERVICE CMNT-IMP: ABNORMAL
SERVICE CMNT-IMP: ABNORMAL

## 2022-06-03 PROCEDURE — 2580000003 HC RX 258: Performed by: ORTHOPAEDIC SURGERY

## 2022-06-03 PROCEDURE — 6370000000 HC RX 637 (ALT 250 FOR IP): Performed by: ORTHOPAEDIC SURGERY

## 2022-06-03 PROCEDURE — 94760 N-INVAS EAR/PLS OXIMETRY 1: CPT

## 2022-06-03 PROCEDURE — 2700000000 HC OXYGEN THERAPY PER DAY

## 2022-06-03 PROCEDURE — 6370000000 HC RX 637 (ALT 250 FOR IP): Performed by: NURSE PRACTITIONER

## 2022-06-03 PROCEDURE — 82962 GLUCOSE BLOOD TEST: CPT

## 2022-06-03 PROCEDURE — 94640 AIRWAY INHALATION TREATMENT: CPT

## 2022-06-03 PROCEDURE — 97530 THERAPEUTIC ACTIVITIES: CPT

## 2022-06-03 RX ORDER — GABAPENTIN 100 MG/1
100 CAPSULE ORAL 3 TIMES DAILY
COMMUNITY

## 2022-06-03 RX ORDER — AZITHROMYCIN 500 MG/1
500 TABLET, FILM COATED ORAL EVERY 24 HOURS
Qty: 1 TABLET | Refills: 0 | Status: SHIPPED | OUTPATIENT
Start: 2022-06-03 | End: 2022-06-04

## 2022-06-03 RX ORDER — LANCETS
EACH MISCELLANEOUS
COMMUNITY
Start: 2022-04-29

## 2022-06-03 RX ORDER — GLIMEPIRIDE 4 MG/1
4 TABLET ORAL
COMMUNITY
Start: 2022-03-24 | End: 2023-03-24

## 2022-06-03 RX ORDER — MORPHINE SULFATE 15 MG/1
15 TABLET ORAL EVERY 4 HOURS PRN
COMMUNITY

## 2022-06-03 RX ORDER — DIAZEPAM 2 MG/1
2 TABLET ORAL EVERY 6 HOURS PRN
COMMUNITY
Start: 2022-03-24

## 2022-06-03 RX ORDER — FUROSEMIDE 40 MG/1
40 TABLET ORAL 2 TIMES DAILY
COMMUNITY

## 2022-06-03 RX ORDER — LOSARTAN POTASSIUM 50 MG/1
50 TABLET ORAL DAILY
COMMUNITY

## 2022-06-03 RX ORDER — PROMETHAZINE HYDROCHLORIDE 25 MG/1
25 TABLET ORAL EVERY 6 HOURS PRN
COMMUNITY
Start: 2021-11-12

## 2022-06-03 RX ORDER — ATORVASTATIN CALCIUM 20 MG/1
20 TABLET, FILM COATED ORAL NIGHTLY
Qty: 30 TABLET | Refills: 0 | Status: SHIPPED | OUTPATIENT
Start: 2022-06-03 | End: 2022-07-03

## 2022-06-03 RX ORDER — CARVEDILOL 12.5 MG/1
12.5 TABLET ORAL 2 TIMES DAILY WITH MEALS
Qty: 60 TABLET | Refills: 0 | Status: SHIPPED | OUTPATIENT
Start: 2022-06-03 | End: 2022-07-03

## 2022-06-03 RX ORDER — TRAZODONE HYDROCHLORIDE 150 MG/1
300 TABLET ORAL NIGHTLY
COMMUNITY
Start: 2021-11-12 | End: 2022-11-12

## 2022-06-03 RX ORDER — CEFPODOXIME PROXETIL 200 MG/1
200 TABLET, FILM COATED ORAL 2 TIMES DAILY
Qty: 2 TABLET | Refills: 0 | Status: SHIPPED | OUTPATIENT
Start: 2022-06-03 | End: 2022-06-04

## 2022-06-03 RX ORDER — OMEPRAZOLE 20 MG/1
20 CAPSULE, DELAYED RELEASE ORAL
COMMUNITY
Start: 2021-11-12 | End: 2022-11-12

## 2022-06-03 RX ORDER — FERROUS SULFATE 325(65) MG
325 TABLET ORAL
COMMUNITY

## 2022-06-03 RX ORDER — CYCLOBENZAPRINE HCL 5 MG
5 TABLET ORAL 3 TIMES DAILY PRN
COMMUNITY
Start: 2021-11-12

## 2022-06-03 RX ORDER — TOPIRAMATE 100 MG/1
25 TABLET, FILM COATED ORAL 2 TIMES DAILY
COMMUNITY

## 2022-06-03 RX ADMIN — ASPIRIN 325 MG: 325 TABLET, COATED ORAL at 08:28

## 2022-06-03 RX ADMIN — CARVEDILOL 12.5 MG: 12.5 TABLET, FILM COATED ORAL at 08:28

## 2022-06-03 RX ADMIN — MORPHINE SULFATE 15 MG: 15 TABLET, EXTENDED RELEASE ORAL at 05:17

## 2022-06-03 RX ADMIN — SODIUM CHLORIDE, PRESERVATIVE FREE 10 ML: 5 INJECTION INTRAVENOUS at 08:28

## 2022-06-03 RX ADMIN — FERROUS SULFATE TAB 325 MG (65 MG ELEMENTAL FE) 325 MG: 325 (65 FE) TAB at 08:28

## 2022-06-03 RX ADMIN — TIOTROPIUM BROMIDE INHALATION SPRAY 2 PUFF: 3.12 SPRAY, METERED RESPIRATORY (INHALATION) at 07:52

## 2022-06-03 RX ADMIN — ACETAMINOPHEN 650 MG: 325 TABLET ORAL at 12:11

## 2022-06-03 RX ADMIN — TOPIRAMATE 25 MG: 25 TABLET, FILM COATED ORAL at 08:28

## 2022-06-03 RX ADMIN — FUROSEMIDE 40 MG: 40 TABLET ORAL at 08:28

## 2022-06-03 RX ADMIN — PANTOPRAZOLE SODIUM 40 MG: 40 TABLET, DELAYED RELEASE ORAL at 05:17

## 2022-06-03 RX ADMIN — OXYCODONE AND ACETAMINOPHEN 1 TABLET: 7.5; 325 TABLET ORAL at 08:33

## 2022-06-03 RX ADMIN — LOSARTAN POTASSIUM 50 MG: 50 TABLET, FILM COATED ORAL at 08:28

## 2022-06-03 RX ADMIN — GABAPENTIN 100 MG: 100 CAPSULE ORAL at 08:28

## 2022-06-03 ASSESSMENT — PAIN DESCRIPTION - LOCATION: LOCATION: BACK

## 2022-06-03 ASSESSMENT — PAIN DESCRIPTION - DESCRIPTORS: DESCRIPTORS: ACHING

## 2022-06-03 ASSESSMENT — PAIN - FUNCTIONAL ASSESSMENT: PAIN_FUNCTIONAL_ASSESSMENT: PREVENTS OR INTERFERES WITH ALL ACTIVE AND SOME PASSIVE ACTIVITIES

## 2022-06-03 ASSESSMENT — PAIN DESCRIPTION - ORIENTATION: ORIENTATION: LOWER

## 2022-06-03 ASSESSMENT — PAIN SCALES - GENERAL
PAINLEVEL_OUTOF10: 6
PAINLEVEL_OUTOF10: 3
PAINLEVEL_OUTOF10: 10
PAINLEVEL_OUTOF10: 0

## 2022-06-03 NOTE — PROGRESS NOTES
Hourly rounds performed. Pt slept most of night, remains confused, only oriented to self. Scheduled pain medication given per MAR. Will continue to monitor.

## 2022-06-03 NOTE — PROGRESS NOTES
[facility]        Guerda 3, 2022         Post Op day: 2 Days Post-Op Procedure(s) (LRB):  LEFT HIP OPEN REDUCTION INTERNAL FIXATION/ CHOICE/  (Left)      Admit Date: 2022  Admit Diagnosis: Closed fracture of left hip, initial encounter ([facility] [S72.002A]  Closed left subtrochanteric femur fracture, initial encounter ([facility] [S72.22XA]       Principle Problem: [facility] Subjective: Doing ok with the hip, complains of hip pain and back pain, No SOB, No Chest Pain, No Nausea or Vomiting     Objective:   Vital Signs are Stable, No Acute Distress, Alert,  Dressing is Dry,  Neurovascular exam is normal.     Assessment / Plan :  Patient Active Problem List   Diagnosis    Closed left hip fracture (HCC)    Pulmonary fibrosis (HCC)    Chronic respiratory failure with hypoxia (HCC)    Pulmonary infiltrate    Chronic back pain    Opioid dependence ([facility]    Hypertension    Hyperlipidemia    Leukocytosis    Type 2 diabetes mellitus ([facility]      Patient Vitals for the past 8 hrs:   BP Temp Temp src Pulse Resp SpO2   22 0754 -- -- -- 89 16 98 %   22 0725 (!) 156/73 98.1 °F (36.7 °C) Oral 72 18 100 %   22 0523 (!) 162/70 98.3 °F (36.8 °C) Axillary 76 18 100 %    Temp (24hrs), Av.4 °F (36.9 °C), Min:98.1 °F (36.7 °C), Max:99 °F (37.2 °C)    Body mass index is 28.12 kg/m².     Lab Results   Component Value Date    HGB 9.6 2022          S/P Procedure(s) (LRB):  LEFT HIP OPEN REDUCTION INTERNAL FIXATION/ CHOICE/  (Left)    Chronic back pain: on baseline pain medication   Medical Mgmt per hospitalist  Anticoagulation plan: ASA 325mg daily  Continue PT  Fall Precautions  DC disp: rehab placement   F/U: 2 weeks postop for wound check and staple removal             Signed By: TRAE Hirsch PA  6/3/2022,  8:23 AM

## 2022-06-03 NOTE — PROGRESS NOTES
PHYSICAL THERAPY Daily Note and AM  (Link to Caseload Tracking: PT Visit Days : 2  Time In/Out PT Charge Capture  Rehab Caseload Tracker  Orders      Adriana Cedillo is a 81 y.o. female   PRIMARY DIAGNOSIS: Closed left hip fracture (HCC)  Closed fracture of left hip, initial encounter ([facility] [S72.002A]  Closed left [facility]rochanteric femur [facility] [S72.22XA]  Procedure(s) (LRB):  LEFT HIP OPEN REDUCTION INTERNAL FIXATION/ CHOICE/  (Left)  2 Days Post-Op  Inpatient: Payor: MEDICARE / Plan: MEDICARE PART A AND B / Product Type: *No Product type* /     ASSESSMENT:     REHAB RECOMMENDATIONS:   Recommendation to date pending progress:  Setting:   Short-term Rehab    Equipment:     To Be Determined     ASSESSMENT:  Ms. Ori Rodríguez was supine upon contact and agreeable to PT; confused with frequent redirection needed. Patient performed supine to sit with mod assist and cues for technique. Patient transfers to standing with min assist and ambulates 15' with rolling walker, CGA-min assist, chair follow, and cues for sequencing. Patient sat in recliner chair to rest then requested to return back to bed. Patient transferred to standing again with min assist and transferred back to bed with min assist. Sit to supine with mod assist. Slow progress. Will continue PT efforts.      SUBJECTIVE:   Ms. Ori Rodríguez states, \"SHUT UP\"     Social/Functional Lives With: Spouse  Type of Home: House  Home Layout: One level  Home Access: Stairs to enter with rails  ADL Assistance: Needs assistance  Bath: Stand by assistance  OBJECTIVE:     PAIN: VITALS / O2: PRECAUTION / Vernel Mejia / DRAINS:   Pre Treatment:   Pain Level: 0      Post Treatment: 0   Vitals        Oxygen    PEG    RESTRICTIONS/PRECAUTIONS:  Restrictions/Precautions  Restrictions/Precautions: Weight Bearing  Lower Extremity Weight Bearing Restrictions  Left Lower Extremity Weight Bearing: Weight Bearing As Tolerated  Restrictions/Precautions: Weight Bearing MOBILITY: I Mod I S SBA CGA Min Mod Max Total  NT x2 Comments:   Bed Mobility    Rolling [] [] [] [] [] [] [] [] [] [] []    Supine to Sit [] [] [] [] [] [] [x] [] [] [] []    Scooting [] [] [] [] [] [] [] [] [] [] []    Sit to Supine [] [] [] [] [] [] [x] [] [] [] []    Transfers    Sit to Stand [] [] [] [] [] [x] [] [] [] [] []    Bed to Chair [] [] [] [] [] [] [] [] [] [] []    Stand to Sit [] [] [] [] [] [x] [] [] [] [] []     [] [] [] [] [] [] [] [] [] [] []    I=Independent, Mod I=Modified Independent, S=Supervision, SBA=Standby Assistance, CGA=Contact Guard Assistance,   Min=Minimal Assistance, Mod=Moderate Assistance, Max=Maximal Assistance, Total=Total Assistance, NT=Not Tested    BALANCE: Good Fair+ Fair Fair- Poor NT Comments   Sitting Static [x] [] [] [] [] []    Sitting Dynamic [x] [x] [] [] [] []              Standing Static [] [] [x] [] [] []    Standing Dynamic [] [] [] [x] [] []      GAIT: I Mod I S SBA CGA Min Mod Max Total  NT x2 Comments:   Level of Assistance [] [] [] [] [x] [x] [] [] [] [] [] Chair follow   Distance 15 feet    DME Rolling Walker    Gait Quality Antalgic, Decreased step clearance, Decreased step length, Decreased stance and Narrow base of support    Weightbearing Status      Stairs      I=Independent, Mod I=Modified Independent, S=Supervision, SBA=Standby Assistance, CGA=Contact Guard Assistance,   Min=Minimal Assistance, Mod=Moderate Assistance, Max=Maximal Assistance, Total=Total Assistance, NT=Not Tested    PLAN:   ACUTE PHYSICAL THERAPY GOALS:   (Developed with and agreed upon by patient and/or caregiver.)  1. Pt will perform bed mobility with Min (A), inc time and use of rails in 7 therapy sessions. 2. Pt will perform sit-to-stand/ stand-to-sit transfers CG (A) c use of LRAD and gait belt in 7 therapy sessions. 3. Pt will ambulate 125 ft CG (A) with use of LRAD and breaks as needed in 7 therapy sessions.   4. Pt will perform standing dynamic balance activities with minimal postural sway in 7 therapy sessions. 5. Pt will tolerate multiple sets and reps of BLE exercises in 7 therapy sessions.       FREQUENCY AND DURATION: BID for duration of hospital stay or until stated goals are met, whichever comes first.      TREATMENT:   TREATMENT:   Therapeutic Activity (24 Minutes): Therapeutic activity included Supine to Sit, Sit to Supine, Scooting, Lateral Scooting, Transfer Training, Ambulation on level ground, Sitting balance  and Standing balance to improve functional Activity tolerance, Balance, Mobility and Strength. TREATMENT GRID:   Date:  6/2 Date:   Date:     Activity/Exercise Parameters Parameters Parameters   Ankle pumps 10     Quad set 10     LAQ 10     SAQ 10     Hip abduction 10     Glut sets 10                 AFTER TREATMENT PRECAUTIONS: Alarm Activated, Bed, Bed/Chair Locked, Call light within reach, Needs within reach, RN notified, Side rails x3 and Visitors at bedside    INTERDISCIPLINARY COLLABORATION:  RN/ PCT and PT/ PTA    EDUCATION:      TIME IN/OUT:  Time In: 1035  Time Out: 95[facility]  Minutes: Adam Moreno PTA

## 2022-06-03 NOTE — DISCHARGE SUMMARY
Hospitalist Discharge Summary   Admit Date:  2022  2:55 PM   DC Note date: 6/3/2022  Name:  Arias Hope   Age:  81 y.o. Sex:  female  :  1935   MRN:  [id number]   Room:  [facility]  PCP:  Jamie Kramer MD    Presenting Complaint: Fall     Initial Admission Diagnosis: Closed fracture of left hip, initial encounter ([facility] [S72.002A]  Closed left subtrochanteric femur fracture, initial encounter ([address] [S72.22XA]     Problem List for this Hospitalization (present on admission):    Principal Problem:    Closed left hip fracture ([address]  Active Problems:    Pulmonary fibrosis ([address]    Chronic respiratory failure with hypoxia ([address]    Pulmonary infiltrate    Chronic back pain    Opioid dependence ([address]    Hypertension    Hyperlipidemia    Leukocytosis    Type 2 diabetes mellitus ([address]  Resolved Problems:    * No resolved hospital problems. *    Did Patient have Sepsis (YES OR NO): No    Hospital Course:  Stephanie Haji is an 81 y.o. female with a PMH of pulmonary fibrosis, chronic hypoxic respiratory failure on 2 L NC, HTN, chronic lower back pain taking opioids, DM2 who is evaluated after a fall while tripping over a rug in the bathroom. She did ambulate to her bed but due to increased pain she presented to the ER.      XR L femur shows inability to exclude non-displaced L femur fracture that is confirmed on CT hip. CT head negative. CXR shows left lower infiltrate. She is followed by [facility] for fibrosis.     She has had prior surgery with tolerance to anesthesia. EKG tracing showing NSR. She was admitted to the Hospitalist service with consult placed to Ortho surgery. She was taken to the OR for an ORIF with Dr. Portia Jenkins on . She has worked with PT/OT since surgery. PT/OT did recommend short-term rehab but the patient and family desire home health w/ PT/OT. Ms. Sarai Colbert is stable at her baseline and may be discharged home on .   She needs to follow up with Orthopaedic surgery in 2 weeks.  She should also follow up with her PCP next week for a check-up and medication review. A&P  Principal Problem:  Closed L subtrochanteric femur fracture, initial encounter ([facility]  POD 2, L HIP OPEN REDUCTION INTERNAL FIXATION  - Ortho surgery on board  - DVT ppx  mg daily per Ortho  - Control pain  - PT/OT recommend STR but patient/family request  w/ PT/OT  - Follow up with Dr. Eli Merino in 2 weeks     Active Problems:  Pulmonary fibrosis ([facility]  Chronic respiratory failure with hypoxia (HCC)  Pulmonary infiltrate  -2 L NC baseline   -Followed by [facility]   -Continued spiriva, esbrief, as needed albuterol  -Rocephin and Azithromycin due to LLL infiltrate. 5 days total treatment. Day 5/5 on Jun/03. Rx provided.   -WBC now wnl     Chronic back pain  Opioid dependence ([facility]  -She takes Percocet 7.5 mg for breakthrough pain and morphine ER 15 mg every 8 hours at home  6/1/22  -Restarting home meds. Verified on [facility] website.  Patient states pain was not controlled with new regimen  - Resume home medications at discharge     Hypertension  -Coreg, losartan  -PRN IV hydralazine   -Control pain     Hyperlipidemia  -Lipitor      DM2  -Accu checks with sliding scale insulin     Dysphagia  -Patient endorses long history  -SLP recommends regular and thin      Disposition: Home health  Diet: ADULT DIET; Regular  Code Status: Full Code    Follow Ups:   Follow-up Information     Charlie Prakash MD In 3 days. Specialty: Family Medicine  Why: post discharge check up and medication review  Contact information:  [address] [place] [address] Drive             Jordan Mayo MD In 2 weeks. Specialties: Orthopaedic Trauma Surgery, Orthopedic Surgery  Why: wound check and staple removal  Contact information:  [address]  [phone]                     Time spent in patient discharge and coordination 39 minutes.     Plan was discussed with the patient, RN, and Case mgmt. All questions answered. Patient was stable at time of discharge. Instructions given to call a physician or return if any concerns. Current Discharge Medication List      START taking these medications    Details   aspirin 325 mg EC tablet Take 1 tablet by mouth daily  Qty: 30 tablet, Refills: 0      atorvastatin (LIPITOR) 20 MG tablet Take 1 tablet by mouth nightly  Qty: 30 tablet, Refills: 0      azithromycin (ZITHROMAX) 500 MG tablet Take 1 tablet by mouth every 24 hours for 1 dose  Qty: 1 tablet, Refills: 0    Associated Diagnoses: Pulmonary infiltrate      carvedilol (COREG) 12.5 MG tablet Take 1 tablet by mouth 2 times daily (with meals)  Qty: 60 tablet, Refills: 0      cefpodoxime (VANTIN) 200 mg tablet Take 1 tablet by mouth 2 times daily for 1 day  Qty: 2 tablet, Refills: 0         CONTINUE these medications which have NOT CHANGED    Details   gabapentin (NEURONTIN) 100 MG capsule Take 100 mg by mouth 3 times daily. ferrous sulfate (IRON 325) 325 (65 Fe) MG tablet Take 325 mg by mouth daily (with breakfast)      furosemide (LASIX) 40 MG tablet Take 40 mg by mouth 2 times daily      losartan (COZAAR) 50 mg tablet Take 50 mg by mouth daily      morphine (MSIR) 15 MG tablet Take 15 mg by mouth every 4 hours as needed for Pain. topiramate (TOPAMAX) 100 MG tablet Take 25 mg by mouth 2 times daily      cyclobenzaprine (FLEXERIL) 5 MG tablet Take 5 mg by mouth 3 times daily as needed      diazePAM (VALIUM) 2 MG tablet Take 2 mg by mouth every 6 hours as needed.       omeprazole (PRILOSEC) 20 MG delayed release capsule Take 20 mg by mouth every morning (before breakfast)      glimepiride (AMARYL) 4 MG tablet Take 4 mg by mouth every morning (before breakfast)      promethazine (PHENERGAN) 25 MG tablet Take 25 mg by mouth every 6 hours as needed      traZODone (DESYREL) 150 MG tablet Take 300 mg by mouth nightly      tiotropium (SPIRIVA RESPIMAT) 2.5 MCG/ACT AERS inhaler Inhale 2 puffs into the lungs daily      Accu-Chek Softclix Lancets MISC 1 EACH DAILY INDICATIONS: DIABETES, E11.9. Procedures done this admission:  Procedure(s):  LEFT HIP OPEN REDUCTION INTERNAL FIXATION/ CHOICE/     Consults this admission:  IP CONSULT TO ORTHOPEDIC SURGERY  IP CONSULT TO CASE MANAGEMENT  FOLLOW UP VISIT  IP CONSULT HOME HEALTH    Echocardiogram results:  No results found for this or any previous visit. Diagnostic Imaging/Tests:   XR LUMBAR SPINE (2-3 VIEWS)    Result Date: 5/30/2022  Lumbar spine Clinical indication: Acute moderate left posterior low back pain after a fall injury and difficulty walking Comparison: none Technique: 3 views Findings: Very low bone density which limits sensitivity for osseous abnormalities and nondisplaced fractures. Scoliotic curvature, extensive multilevel chronic appearing degenerative changes. There is no evidence of fracture, dislocation, or erosion. Note of constipation. Nonobstructive bowel gas pattern as seen. Cholecystectomy clips in the upper abdomen. No acute fracture. Scoliosis, degenerative changes, low bone density noted. XR FEMUR LEFT (MIN 2 VIEWS)    Result Date: 5/30/2022  Pelvis with left hip Left femur Clinical indication: Acute fall injury with moderate pain involving left hip and pelvis and difficulty walking Comparison: none Technique: 3 views of the pelvis with left hip, 2 views of the left femur Findings: Very low bone density which limits sensitivity for osseous abnormalities and nondisplaced fractures. Pelvis with left hip: Chronic arthropathic changes of the pelvis and both hips not unusual for age. No evidence of a displaced fracture or dislocation. Left hip is held in some degree of rotation which limits detail. Nondisplaced fracture cannot be excluded at this level. Left femur: There is no evidence of displaced fracture, dislocation, or erosion.   Lateral view limited by superimposed structures and prominent soft tissues at the level of the proximal femur. 1. No evidence of a displaced fracture or dislocation. 2. Low bone density, and left hip rotation limiting some detail. Cannot exclude nondisplaced proximal femur fracture. CT HEAD WO CONTRAST    Result Date: 5/30/2022  Noncontrast head CT Clinical Indication: Acute traumatic fall injury with left mild frontotemporal head pain, difficulty walking. Technique: Noncontrast axial images were obtained through the brain. All CT scans at this location are performed using dose modulation techniques as appropriate including the following: Automated exposure control, adjustment of the MA and/or kV according to patient's size, or use of iterative reconstruction technique. Reformatted sagittal, coronal images obtained to further evaluate bones, soft tissues, extra-axial spaces. Comparison: None available Findings: There is no acute intracranial hemorrhage, hydrocephalus, intra-axial mass, or mass-effect. There is no CT evidence of acute large artery territorial infarction or abnormal extra-axial fluid collection. The mastoid air cells demonstrate a small effusion on the left but no aggressive features. Paranasal sinuses are clear where imaged. No displaced skull fractures are present. No CT evidence of acute intracranial abnormality. CT CERVICAL SPINE WO CONTRAST    Result Date: 5/30/2022  CT of the Cervical Spine INDICATION:  Acute traumatic fall injury with moderate left posterior neck pain, difficulty walking. COMPARISON: None TECHNIQUE: Dose reduction technique used: Automated exposure Control and/or adjustment of mA and kV according to patient size. Multiple axial images were obtained through the cervical spine without intravenous contrast. Coronal and sagittal reformatted images were also reviewed for further evaluation of osseous structures. FINDINGS: There is no evidence of fracture or subluxation. There are diffuse chronic degenerative changes not unusual for age.  No focal suspicious osseous lesions are seen. There is no prevertebral soft tissue swelling. No acute osseous abnormality. XR CHEST 1 VIEW    Result Date: 5/30/2022  Chest portable CLINICAL INDICATION: Acute fall injury with weakness, difficulty walking, elevated white blood cell count, low hematocrit COMPARISON: None TECHNIQUE: single AP portable view chest at 3:30 PM supine FINDINGS: There are diffuse mild to moderate reticular opacities throughout the lungs. There is partial consolidation at the posterior left lower lobe. Cardiac silhouette is mildly enlarged. Scattered aortic calcifications are not unusual for age. No evidence of a pleural effusion or pneumothorax. Mild chronic. Bilateral peripheral lung scarring, and scattered subsegmental atelectasis, not unusual for age. Bone density is low. No displaced fracture or dislocation is seen in the visualized thoracic osseous structures. 1. Left basilar pneumonia. 2. Cardiac enlargement and pulmonary edema suggested. CT HIP LEFT WO CONTRAST    Result Date: 5/30/2022  CT of the pelvis and left hip without INDICATION:  Acute fall injury with moderate pain involving left hip and pelvis and difficulty walking, abnormal appearance of left proximal femur on radiograph today indeterminate for fracture COMPARISON: Radiograph of the pelvis, left hip, left femur performed earlier today TECHNIQUE: Dose reduction technique used: Automated exposure Control and/or adjustment of mA and kV according to patient size. Multiple axial images were obtained through the pelvis and the left hip without intravenous contrast. Coronal and sagittal reformatted images were also performed for further evaluation of osseous structures. FINDINGS: Proximal left femur cortex at the junction of the head and neck notable for tiny subtle curvilinear lucency with cortical irregularity, highly suspicious for nondisplaced fracture in this setting.  There is no evidence of displaced fracture or subluxation. No focal aggressive lytic or blastic bony lesions are seen. Bone density is low throughout which limits the sensitivity for subtle osseous abnormalities and nondisplaced fractures. There are diffuse degenerative changes not unusual for age. The included pelvic soft tissues are notable for scattered age commensurate vascular calcifications, diverticulosis large bowel, mild fluid distention of urinary bladder. 1. Suspected nondisplaced fracture of left subcapital femoral neck. 2. Low bone density, degenerative changes. XR HIP 2-3 VW W PELVIS LEFT    Result Date: 5/30/2022  Pelvis with left hip Left femur Clinical indication: Acute fall injury with moderate pain involving left hip and pelvis and difficulty walking Comparison: none Technique: 3 views of the pelvis with left hip, 2 views of the left femur Findings: Very low bone density which limits sensitivity for osseous abnormalities and nondisplaced fractures. Pelvis with left hip: Chronic arthropathic changes of the pelvis and both hips not unusual for age. No evidence of a displaced fracture or dislocation. Left hip is held in some degree of rotation which limits detail. Nondisplaced fracture cannot be excluded at this level. Left femur: There is no evidence of displaced fracture, dislocation, or erosion. Lateral view limited by superimposed structures and prominent soft tissues at the level of the proximal femur. 1. No evidence of a displaced fracture or dislocation. 2. Low bone density, and left hip rotation limiting some detail. Cannot exclude nondisplaced proximal femur fracture. Labs: Results:       BMP, Mg, Phos Recent Labs     06/02/22  0509   *   K 3.7      CO2 24   BUN 22      CBC Recent Labs     06/02/22  1014   WBC 10.2   RBC 3.28*   HGB 9.6*   HCT 28.8*         LFT No results for input(s): ALT, TP, ALB, GLOB in the last 72 hours.     Invalid input(s): SGOT, TBIL, AP, AGRAT, GPT   Cardiac Testing No results found for: BNP, CPK, RCK1, CKMB   Coagulation Tests No results found for: INR, APTT   A1c No results found for: HBA1C   Lipid Panel No results found for: CHOL, CHOLPOCT, CHOLX, CHLST, CHOLV, 328406, HDL, HDLC, LDL, LDLC, 085679, VLDLC, VLDL, TGLX, TRIGL   Thyroid Panel No results found for: TSH, T4, FT4     Most Recent UA No results found for: MUCUS, UCOM       All Labs from Last 24 Hrs:  Recent Results (from the past 24 hour(s))   POCT Glucose    Collection Time: 06/02/22  3:41 PM   Result Value Ref Range    POC Glucose 201 (H) 65 - 100 mg/dL    Performed by: Ariel    PLEASE READ & DOCUMENT PPD TEST IN 48 HRS    Collection Time: 06/02/22  5:37 PM   Result Value Ref Range    PPD, (POC) Negative Negative    mm Induration 0 0 - 5 mm   POCT Glucose    Collection Time: 06/02/22  8:22 PM   Result Value Ref Range    POC Glucose 185 (H) 65 - 100 mg/dL    Performed by: Lisset    POCT Glucose    Collection Time: 06/03/22  7:26 AM   Result Value Ref Range    POC Glucose 168 (H) 65 - 100 mg/dL    Performed by: Ariel        Allergies   Allergen Reactions    Carbamazepine     Metronidazole Nausea Only     Other reaction(s): Nausea    Prednisone      Other reaction(s): Rash    Tetanus Toxoid      Other reaction(s): Rash     Immunization History   Administered Date(s) Administered    PPD Test 05/31/2022       Recent Vital Data:  Patient Vitals for the past 24 hrs:   Temp Pulse Resp BP SpO2   06/03/22 0905 -- 89 -- (!) 164/72 98 %   06/03/22 0903 -- -- 20 -- --   06/03/22 0833 -- -- 18 -- --   06/03/22 0754 -- 89 16 -- 98 %   06/03/22 0725 98.1 °F (36.7 °C) 72 18 (!) 156/73 100 %   06/03/22 0523 98.3 °F (36.8 °C) 76 18 (!) 162/70 100 %   06/03/22 0017 98.4 °F (36.9 °C) 73 18 (!) 151/65 95 %   06/02/22 1919 98.2 °F (36.8 °C) 73 18 (!) 125/42 93 %   06/02/22 1540 99 °F (37.2 °C) 66 19 (!) 147/65 100 %   06/02/22 1452 -- -- 18 -- --       Oxygen Therapy  SpO2: 98 %  Pulse Oximeter Device Mode: Continuous  Pulse Oximeter Device Location: Left,Hand  O2 Device: Nasal cannula  O2 Flow Rate (L/min): 3 L/min (weaned to 2L)    Estimated body mass index is 28.12 kg/m² as calculated from the following:    Height as of this encounter: 5' 6\" (1.676 m). Weight as of this encounter: 174 lb 3.2 oz (79 kg). No intake or output data in the 24 hours ending 06/03/22 1128      Physical Exam:  General:    No overt distress  Head:  Normocephalic, atraumatic  Eyes:  Sclerae appear normal.  Pupils equally round. HENT:  Nares appear normal, no drainage. Moist mucous membranes  Neck:  No restricted ROM. Trachea midline  CV:   RRR. No m/r/g. Lungs: No wheezing. Even, unlabored on baseline O2. Abdomen:   Soft, nontender, nondistended. Extremities: Warm and dry. No cyanosis or clubbing. Skin:     No rashes. Normal coloration  Neuro:  CN II-XII grossly intact. Psych:  Normal mood and affect. Signed:  JAVIER Prieto CNP    Part of this note may have been written by using a voice dictation software. The note has been proof read but may still contain some grammatical/other typographical errors.

## 2022-06-03 NOTE — PROGRESS NOTES
Discharge instructions given. Education provided. All questions answered and verbally voiced understanding. Medication changes and follow up appointments discussed. AVS reviewed, signed, and placed in chart. Copy provided for pt. Pt  here at bedside and awaiting for daughter to arrive with patient's clothing at this time. IV access out. Dressing c/d/i.

## 2022-06-05 ENCOUNTER — HOME CARE VISIT (OUTPATIENT)
Dept: SCHEDULING | Facility: HOME HEALTH | Age: 87
End: 2022-06-05
Payer: MEDICARE

## 2022-06-05 PROCEDURE — 400013 HH SOC

## 2022-06-05 PROCEDURE — G0151 HHCP-SERV OF PT,EA 15 MIN: HCPCS

## 2022-06-05 PROCEDURE — 1090000001 HH PPS REVENUE CREDIT

## 2022-06-05 PROCEDURE — 1090000002 HH PPS REVENUE DEBIT

## 2022-06-05 PROCEDURE — 0221000100 HH NO PAY CLAIM PROCEDURE

## 2022-06-06 ENCOUNTER — TELEPHONE (OUTPATIENT)
Dept: ORTHOPEDIC SURGERY | Age: 87
End: 2022-06-06

## 2022-06-06 VITALS
OXYGEN SATURATION: 98 % | DIASTOLIC BLOOD PRESSURE: 70 MMHG | RESPIRATION RATE: 17 BRPM | TEMPERATURE: 97.1 F | HEART RATE: 80 BPM | SYSTOLIC BLOOD PRESSURE: 118 MMHG

## 2022-06-06 PROCEDURE — 1090000001 HH PPS REVENUE CREDIT

## 2022-06-06 PROCEDURE — 1090000002 HH PPS REVENUE DEBIT

## 2022-06-06 ASSESSMENT — ENCOUNTER SYMPTOMS
PAIN LOCATION - PAIN QUALITY: ACHE
DYSPNEA ACTIVITY LEVEL: AFTER AMBULATING LESS THAN 10 FT

## 2022-06-06 NOTE — PROGRESS NOTES
Physician Progress Note      Sarah Johnson  CSN #:                  [id number]  :                       1935  ADMIT DATE:       2022 2:55 PM  [address] [place] DATE:  RESPONDING  PROVIDER #:        Sanjay COSTELLO          QUERY TEXT:    Pt admitted with femur fracture. Pt noted to have Leukocytosis, cxr with LLL   infiltrates and started on antibiotics. If possible, please document in the   progress notes and discharge summary if you are evaluating and/or treating any   of the following: The medical record reflects the following:  Risk Factors: 81 yr, Pulmonary fibrosis, home o2 use 2L n/c  Clinical Indicators: WBC 14.0 on admission, CXR shows left lower infiltrate,   dyspnea/cough  Treatment: IV Zithromax, IV Rocephin, supplemental oxygen at 3L n/c  Options provided:  -- Bacterial pneumonia  -- Gram negative pneumonia  -- Gram positive pneumonia  -- Other - I will add my own diagnosis  -- Disagree - Not applicable / Not valid  -- Disagree - Clinically unable to determine / Unknown  -- Refer to Clinical Documentation Reviewer    PROVIDER RESPONSE TEXT:    This patient has bacterial pneumonia.     Query created by: Brennon Davenport on 2022 2:23 PM      Electronically signed by:  Sanjay COSTELLO 2022 4:13 PM

## 2022-06-06 NOTE — TELEPHONE ENCOUNTER
Henrietta Delvalle PT w/ [facility] [phone] needs verbal orders for home PT to see 2xwk for 3wks also incision was uncovered and exposed to open air and needs bandage orders

## 2022-06-07 ENCOUNTER — TELEPHONE (OUTPATIENT)
Dept: ORTHOPEDIC SURGERY | Age: 87
End: 2022-06-07

## 2022-06-07 PROCEDURE — 1090000002 HH PPS REVENUE DEBIT

## 2022-06-07 PROCEDURE — 1090000001 HH PPS REVENUE CREDIT

## 2022-06-08 ENCOUNTER — HOME CARE VISIT (OUTPATIENT)
Dept: SCHEDULING | Facility: HOME HEALTH | Age: 87
End: 2022-06-08
Payer: MEDICARE

## 2022-06-08 VITALS
DIASTOLIC BLOOD PRESSURE: 84 MMHG | OXYGEN SATURATION: 98 % | SYSTOLIC BLOOD PRESSURE: 140 MMHG | HEART RATE: 83 BPM | TEMPERATURE: 97.7 F | RESPIRATION RATE: 18 BRPM

## 2022-06-08 PROCEDURE — 1090000001 HH PPS REVENUE CREDIT

## 2022-06-08 PROCEDURE — 1090000002 HH PPS REVENUE DEBIT

## 2022-06-08 PROCEDURE — G0157 HHC PT ASSISTANT EA 15: HCPCS

## 2022-06-08 PROCEDURE — G0299 HHS/HOSPICE OF RN EA 15 MIN: HCPCS

## 2022-06-08 ASSESSMENT — ENCOUNTER SYMPTOMS
PAIN LOCATION - PAIN QUALITY: ACHING
DYSPNEA ACTIVITY LEVEL: AFTER AMBULATING 10 - 20 FT

## 2022-06-09 VITALS
OXYGEN SATURATION: 98 % | DIASTOLIC BLOOD PRESSURE: 80 MMHG | HEART RATE: 82 BPM | TEMPERATURE: 98.9 F | SYSTOLIC BLOOD PRESSURE: 148 MMHG | RESPIRATION RATE: 16 BRPM

## 2022-06-09 PROCEDURE — 1090000002 HH PPS REVENUE DEBIT

## 2022-06-09 PROCEDURE — 1090000001 HH PPS REVENUE CREDIT

## 2022-06-09 ASSESSMENT — ENCOUNTER SYMPTOMS: PAIN LOCATION - PAIN QUALITY: ACHE

## 2022-06-10 ENCOUNTER — HOME CARE VISIT (OUTPATIENT)
Dept: SCHEDULING | Facility: HOME HEALTH | Age: 87
End: 2022-06-10
Payer: MEDICARE

## 2022-06-10 VITALS
OXYGEN SATURATION: 97 % | TEMPERATURE: 98.4 F | SYSTOLIC BLOOD PRESSURE: 118 MMHG | RESPIRATION RATE: 19 BRPM | DIASTOLIC BLOOD PRESSURE: 62 MMHG | HEART RATE: 77 BPM

## 2022-06-10 PROCEDURE — 1090000001 HH PPS REVENUE CREDIT

## 2022-06-10 PROCEDURE — 1090000002 HH PPS REVENUE DEBIT

## 2022-06-10 PROCEDURE — G0299 HHS/HOSPICE OF RN EA 15 MIN: HCPCS

## 2022-06-11 PROCEDURE — 1090000001 HH PPS REVENUE CREDIT

## 2022-06-11 PROCEDURE — 1090000002 HH PPS REVENUE DEBIT

## 2022-06-12 PROCEDURE — 1090000002 HH PPS REVENUE DEBIT

## 2022-06-12 PROCEDURE — 1090000001 HH PPS REVENUE CREDIT

## 2022-06-13 ENCOUNTER — HOME CARE VISIT (OUTPATIENT)
Dept: SCHEDULING | Facility: HOME HEALTH | Age: 87
End: 2022-06-13
Payer: MEDICARE

## 2022-06-13 VITALS
TEMPERATURE: 97.2 F | HEART RATE: 64 BPM | SYSTOLIC BLOOD PRESSURE: 136 MMHG | DIASTOLIC BLOOD PRESSURE: 60 MMHG | OXYGEN SATURATION: 98 % | RESPIRATION RATE: 16 BRPM

## 2022-06-13 PROCEDURE — G0152 HHCP-SERV OF OT,EA 15 MIN: HCPCS

## 2022-06-13 PROCEDURE — 1090000001 HH PPS REVENUE CREDIT

## 2022-06-13 PROCEDURE — 1090000002 HH PPS REVENUE DEBIT

## 2022-06-13 ASSESSMENT — ENCOUNTER SYMPTOMS: PAIN LOCATION - PAIN QUALITY: ACHEY/SORE

## 2022-06-14 ENCOUNTER — HOME CARE VISIT (OUTPATIENT)
Dept: SCHEDULING | Facility: HOME HEALTH | Age: 87
End: 2022-06-14
Payer: MEDICARE

## 2022-06-14 VITALS
SYSTOLIC BLOOD PRESSURE: 136 MMHG | TEMPERATURE: 98.1 F | OXYGEN SATURATION: 97 % | HEART RATE: 71 BPM | RESPIRATION RATE: 16 BRPM | DIASTOLIC BLOOD PRESSURE: 64 MMHG

## 2022-06-14 PROCEDURE — G0299 HHS/HOSPICE OF RN EA 15 MIN: HCPCS

## 2022-06-14 PROCEDURE — 1090000002 HH PPS REVENUE DEBIT

## 2022-06-14 PROCEDURE — 1090000001 HH PPS REVENUE CREDIT

## 2022-06-14 ASSESSMENT — ENCOUNTER SYMPTOMS
PAIN LOCATION - PAIN QUALITY: ACHE
STOOL DESCRIPTION: SOFT FORMED
DYSPNEA ACTIVITY LEVEL: AFTER AMBULATING 10 - 20 FT

## 2022-06-15 ENCOUNTER — HOME CARE VISIT (OUTPATIENT)
Dept: SCHEDULING | Facility: HOME HEALTH | Age: 87
End: 2022-06-15
Payer: MEDICARE

## 2022-06-15 VITALS
TEMPERATURE: 96.6 F | OXYGEN SATURATION: 98 % | SYSTOLIC BLOOD PRESSURE: 138 MMHG | DIASTOLIC BLOOD PRESSURE: 68 MMHG | HEART RATE: 74 BPM | RESPIRATION RATE: 16 BRPM

## 2022-06-15 PROBLEM — I10 ESSENTIAL HYPERTENSION: Status: ACTIVE | Noted: 2017-03-19

## 2022-06-15 PROBLEM — I87.2 EDEMA OF BOTH LOWER LEGS DUE TO PERIPHERAL VENOUS INSUFFICIENCY: Status: ACTIVE | Noted: 2017-11-27

## 2022-06-15 PROBLEM — J96.11 CHRONIC RESPIRATORY FAILURE WITH HYPOXIA (HCC): Chronic | Status: ACTIVE | Noted: 2021-02-19

## 2022-06-15 PROBLEM — R60.0 EDEMA OF BOTH LOWER LEGS DUE TO PERIPHERAL VENOUS INSUFFICIENCY: Status: ACTIVE | Noted: 2017-11-27

## 2022-06-15 PROBLEM — M19.019 OSTEOARTHRITIS OF SHOULDER: Status: ACTIVE | Noted: 2017-03-19

## 2022-06-15 PROBLEM — I50.32 CHRONIC DIASTOLIC CONGESTIVE HEART FAILURE (HCC): Status: ACTIVE | Noted: 2021-02-20

## 2022-06-15 PROBLEM — M54.50 CHRONIC BILATERAL LOW BACK PAIN WITHOUT SCIATICA: Status: ACTIVE | Noted: 2017-09-26

## 2022-06-15 PROBLEM — M47.817 LUMBOSACRAL SPONDYLOSIS WITHOUT MYELOPATHY: Status: ACTIVE | Noted: 2022-03-24

## 2022-06-15 PROBLEM — J43.1 PANLOBULAR EMPHYSEMA (HCC): Status: ACTIVE | Noted: 2022-06-09

## 2022-06-15 PROBLEM — E11.65 TYPE 2 DIABETES MELLITUS WITH HYPERGLYCEMIA, WITHOUT LONG-TERM CURRENT USE OF INSULIN (HCC): Status: ACTIVE | Noted: 2018-03-26

## 2022-06-15 PROBLEM — M41.9 SCOLIOSIS: Status: ACTIVE | Noted: 2017-03-19

## 2022-06-15 PROBLEM — M51.36 DEGENERATION OF LUMBAR INTERVERTEBRAL DISC: Status: ACTIVE | Noted: 2022-03-24

## 2022-06-15 PROBLEM — F41.8 MIXED ANXIETY DEPRESSIVE DISORDER: Status: ACTIVE | Noted: 2017-03-19

## 2022-06-15 PROBLEM — E78.5 HYPERLIPIDEMIA ASSOCIATED WITH TYPE 2 DIABETES MELLITUS (HCC): Status: ACTIVE | Noted: 2017-03-19

## 2022-06-15 PROBLEM — G89.29 CHRONIC BILATERAL LOW BACK PAIN WITHOUT SCIATICA: Status: ACTIVE | Noted: 2017-09-26

## 2022-06-15 PROBLEM — J84.9 INTERSTITIAL LUNG DISEASE (HCC): Status: ACTIVE | Noted: 2021-02-20

## 2022-06-15 PROBLEM — M51.369 DEGENERATION OF LUMBAR INTERVERTEBRAL DISC: Status: ACTIVE | Noted: 2022-03-24

## 2022-06-15 PROBLEM — S22.000A COMPRESSION FRACTURE OF THORACIC VERTEBRA (HCC): Status: ACTIVE | Noted: 2022-06-09

## 2022-06-15 PROBLEM — K21.9 GASTROESOPHAGEAL REFLUX DISEASE WITHOUT ESOPHAGITIS: Status: ACTIVE | Noted: 2017-03-19

## 2022-06-15 PROBLEM — G89.4 CHRONIC PAIN DISORDER: Status: ACTIVE | Noted: 2020-04-29

## 2022-06-15 PROBLEM — E11.69 HYPERLIPIDEMIA ASSOCIATED WITH TYPE 2 DIABETES MELLITUS (HCC): Status: ACTIVE | Noted: 2017-03-19

## 2022-06-15 PROCEDURE — 1090000001 HH PPS REVENUE CREDIT

## 2022-06-15 PROCEDURE — 1090000002 HH PPS REVENUE DEBIT

## 2022-06-15 PROCEDURE — G0157 HHC PT ASSISTANT EA 15: HCPCS

## 2022-06-15 RX ORDER — OXYCODONE AND ACETAMINOPHEN 7.5; 325 MG/1; MG/1
TABLET ORAL
COMMUNITY
Start: 2022-05-09

## 2022-06-15 RX ORDER — CEFPODOXIME PROXETIL 200 MG/1
TABLET, FILM COATED ORAL
COMMUNITY
Start: 2022-06-03

## 2022-06-15 RX ORDER — GABAPENTIN 600 MG/1
TABLET ORAL
COMMUNITY

## 2022-06-15 RX ORDER — AZITHROMYCIN 500 MG/1
TABLET, FILM COATED ORAL
COMMUNITY
Start: 2022-06-03

## 2022-06-15 RX ORDER — OXYMETAZOLINE HYDROCHLORIDE 0.05 G/100ML
2 SPRAY NASAL 2 TIMES DAILY PRN
COMMUNITY

## 2022-06-15 RX ORDER — LOSARTAN POTASSIUM AND HYDROCHLOROTHIAZIDE 12.5; 1 MG/1; MG/1
TABLET ORAL
COMMUNITY

## 2022-06-15 RX ORDER — FUROSEMIDE 40 MG/1
TABLET ORAL
COMMUNITY

## 2022-06-15 ASSESSMENT — ENCOUNTER SYMPTOMS: PAIN LOCATION - PAIN QUALITY: ACHING

## 2022-06-16 ENCOUNTER — OFFICE VISIT (OUTPATIENT)
Dept: ORTHOPEDIC SURGERY | Age: 87
End: 2022-06-16

## 2022-06-16 DIAGNOSIS — S72.002D CLOSED FRACTURE OF LEFT HIP WITH ROUTINE HEALING, SUBSEQUENT ENCOUNTER: Primary | ICD-10-CM

## 2022-06-16 PROCEDURE — 99024 POSTOP FOLLOW-UP VISIT: CPT | Performed by: ORTHOPAEDIC SURGERY

## 2022-06-16 PROCEDURE — 1090000002 HH PPS REVENUE DEBIT

## 2022-06-16 PROCEDURE — 1090000001 HH PPS REVENUE CREDIT

## 2022-06-16 NOTE — PROGRESS NOTES
Progress Note    Patient: Paloma Mccallum MRN: [id number]  SSN: [id number]    YOB: 1935  Age: 81 y.o. Sex: female        6/16/2022      Subjective:     Patient is proximately 2 weeks out from plate-screw fixation of a left femoral neck fracture. She says she still having a good bit of pain in her hip but she is now that she has a lot of pain in her back and she takes some chronic pain medication for this. Her  thinks she is definitely able to get around a little bit better than she was even a few days ago    Objective: There were no vitals filed for this visit. Physical Exam:     Skin - incision is well healed with no redness or drainage  Motor and sensory function intact in LEFT LOWER extremity  Pulses palpable in LEFT LOWER extremity     XRAY FINDINGS:  No new x-rays today    Assessment:     2 weeks out from left femoral neck fracture plate and screw fixation    Plan:     I think at this point she can be weightbearing as tolerated left lower extremity. She may full active and gentle passive range of motion of the left hip.   I will see her back in 4 weeks with AP and lateral left hip on return she will be about 6 weeks out at that time    Signed By: Reji Otero MD     June 16, 2022

## 2022-06-17 ENCOUNTER — HOME CARE VISIT (OUTPATIENT)
Dept: SCHEDULING | Facility: HOME HEALTH | Age: 87
End: 2022-06-17
Payer: MEDICARE

## 2022-06-17 VITALS
HEART RATE: 76 BPM | DIASTOLIC BLOOD PRESSURE: 60 MMHG | SYSTOLIC BLOOD PRESSURE: 116 MMHG | RESPIRATION RATE: 17 BRPM | TEMPERATURE: 98.6 F | OXYGEN SATURATION: 97 %

## 2022-06-17 VITALS
SYSTOLIC BLOOD PRESSURE: 125 MMHG | DIASTOLIC BLOOD PRESSURE: 62 MMHG | OXYGEN SATURATION: 98 % | RESPIRATION RATE: 16 BRPM | TEMPERATURE: 98.5 F | HEART RATE: 75 BPM

## 2022-06-17 PROCEDURE — 1090000001 HH PPS REVENUE CREDIT

## 2022-06-17 PROCEDURE — G0157 HHC PT ASSISTANT EA 15: HCPCS

## 2022-06-17 PROCEDURE — 1090000002 HH PPS REVENUE DEBIT

## 2022-06-17 PROCEDURE — G0299 HHS/HOSPICE OF RN EA 15 MIN: HCPCS

## 2022-06-17 ASSESSMENT — ENCOUNTER SYMPTOMS
PAIN LOCATION - PAIN QUALITY: ACHING
STOOL DESCRIPTION: SOFT FORMED
PAIN LOCATION - PAIN QUALITY: ACHE

## 2022-06-18 PROCEDURE — 1090000001 HH PPS REVENUE CREDIT

## 2022-06-18 PROCEDURE — 1090000002 HH PPS REVENUE DEBIT

## 2022-06-19 PROCEDURE — 1090000001 HH PPS REVENUE CREDIT

## 2022-06-19 PROCEDURE — 1090000002 HH PPS REVENUE DEBIT

## 2022-06-20 ENCOUNTER — HOME CARE VISIT (OUTPATIENT)
Dept: SCHEDULING | Facility: HOME HEALTH | Age: 87
End: 2022-06-20
Payer: MEDICARE

## 2022-06-20 VITALS
OXYGEN SATURATION: 98 % | RESPIRATION RATE: 17 BRPM | SYSTOLIC BLOOD PRESSURE: 145 MMHG | HEART RATE: 69 BPM | DIASTOLIC BLOOD PRESSURE: 70 MMHG | TEMPERATURE: 98.4 F

## 2022-06-20 PROCEDURE — 1090000002 HH PPS REVENUE DEBIT

## 2022-06-20 PROCEDURE — G0157 HHC PT ASSISTANT EA 15: HCPCS

## 2022-06-20 PROCEDURE — 1090000001 HH PPS REVENUE CREDIT

## 2022-06-20 ASSESSMENT — ENCOUNTER SYMPTOMS: PAIN LOCATION - PAIN QUALITY: ACHING, THROBBING

## 2022-06-21 ENCOUNTER — HOME CARE VISIT (OUTPATIENT)
Dept: SCHEDULING | Facility: HOME HEALTH | Age: 87
End: 2022-06-21
Payer: MEDICARE

## 2022-06-21 VITALS
OXYGEN SATURATION: 97 % | DIASTOLIC BLOOD PRESSURE: 76 MMHG | TEMPERATURE: 98.3 F | RESPIRATION RATE: 16 BRPM | HEART RATE: 85 BPM | SYSTOLIC BLOOD PRESSURE: 144 MMHG

## 2022-06-21 PROCEDURE — 1090000001 HH PPS REVENUE CREDIT

## 2022-06-21 PROCEDURE — G0299 HHS/HOSPICE OF RN EA 15 MIN: HCPCS

## 2022-06-21 PROCEDURE — 1090000002 HH PPS REVENUE DEBIT

## 2022-06-21 ASSESSMENT — ENCOUNTER SYMPTOMS
DYSPNEA ACTIVITY LEVEL: AFTER AMBULATING MORE THAN 20 FT
STOOL DESCRIPTION: SOFT FORMED
PAIN LOCATION - PAIN QUALITY: ACHE

## 2022-06-22 ENCOUNTER — HOME CARE VISIT (OUTPATIENT)
Dept: SCHEDULING | Facility: HOME HEALTH | Age: 87
End: 2022-06-22
Payer: MEDICARE

## 2022-06-22 VITALS
DIASTOLIC BLOOD PRESSURE: 78 MMHG | HEART RATE: 80 BPM | TEMPERATURE: 99.1 F | SYSTOLIC BLOOD PRESSURE: 136 MMHG | RESPIRATION RATE: 16 BRPM | OXYGEN SATURATION: 96 %

## 2022-06-22 PROCEDURE — 1090000002 HH PPS REVENUE DEBIT

## 2022-06-22 PROCEDURE — 1090000001 HH PPS REVENUE CREDIT

## 2022-06-22 PROCEDURE — G0151 HHCP-SERV OF PT,EA 15 MIN: HCPCS

## 2022-06-23 PROCEDURE — 1090000001 HH PPS REVENUE CREDIT

## 2022-06-23 PROCEDURE — 1090000002 HH PPS REVENUE DEBIT

## 2022-06-24 PROCEDURE — 1090000001 HH PPS REVENUE CREDIT

## 2022-06-24 PROCEDURE — 1090000002 HH PPS REVENUE DEBIT

## 2022-06-25 PROCEDURE — 1090000002 HH PPS REVENUE DEBIT

## 2022-06-25 PROCEDURE — 1090000001 HH PPS REVENUE CREDIT

## 2022-06-26 PROCEDURE — 1090000001 HH PPS REVENUE CREDIT

## 2022-06-26 PROCEDURE — 1090000002 HH PPS REVENUE DEBIT

## 2022-06-27 PROCEDURE — 1090000001 HH PPS REVENUE CREDIT

## 2022-06-27 PROCEDURE — 1090000002 HH PPS REVENUE DEBIT

## 2022-06-28 ENCOUNTER — HOME CARE VISIT (OUTPATIENT)
Dept: SCHEDULING | Facility: HOME HEALTH | Age: 87
End: 2022-06-28
Payer: MEDICARE

## 2022-06-28 VITALS
TEMPERATURE: 98.5 F | HEART RATE: 70 BPM | RESPIRATION RATE: 16 BRPM | OXYGEN SATURATION: 97 % | DIASTOLIC BLOOD PRESSURE: 68 MMHG | SYSTOLIC BLOOD PRESSURE: 118 MMHG

## 2022-06-28 PROCEDURE — 1090000002 HH PPS REVENUE DEBIT

## 2022-06-28 PROCEDURE — 1090000001 HH PPS REVENUE CREDIT

## 2022-06-28 PROCEDURE — G0299 HHS/HOSPICE OF RN EA 15 MIN: HCPCS

## 2022-06-28 ASSESSMENT — ENCOUNTER SYMPTOMS
PAIN LOCATION - PAIN QUALITY: ACHE
STOOL DESCRIPTION: SOFT FORMED

## 2022-06-29 ENCOUNTER — HOME CARE VISIT (OUTPATIENT)
Dept: SCHEDULING | Facility: HOME HEALTH | Age: 87
End: 2022-06-29
Payer: MEDICARE

## 2022-06-29 VITALS
HEART RATE: 72 BPM | RESPIRATION RATE: 16 BRPM | TEMPERATURE: 98.4 F | DIASTOLIC BLOOD PRESSURE: 56 MMHG | SYSTOLIC BLOOD PRESSURE: 118 MMHG | OXYGEN SATURATION: 98 %

## 2022-06-29 PROCEDURE — 1090000001 HH PPS REVENUE CREDIT

## 2022-06-29 PROCEDURE — G0151 HHCP-SERV OF PT,EA 15 MIN: HCPCS

## 2022-06-29 PROCEDURE — 1090000002 HH PPS REVENUE DEBIT

## 2022-06-29 ASSESSMENT — ENCOUNTER SYMPTOMS
PAIN LOCATION - PAIN QUALITY: ACHE
CONSTIPATION: 1
DYSPNEA ACTIVITY LEVEL: AFTER AMBULATING MORE THAN 20 FT

## 2022-10-13 LAB
EKG ATRIAL RATE: 90 BPM
EKG DIAGNOSIS: ABNORMAL
EKG P AXIS: 63 DEGREES
EKG P-R INTERVAL: 182 MS
EKG Q-T INTERVAL: 440 MS
EKG QRS DURATION: 53 MS
EKG QTC CALCULATION (BAZETT): 539 MS
EKG R AXIS: 45 DEGREES
EKG T AXIS: 8 DEGREES
EKG VENTRICULAR RATE: 90 BPM

## 2022-10-13 ASSESSMENT — ENCOUNTER SYMPTOMS
BACK PAIN: 0
COUGH: 0
ABDOMINAL PAIN: 0
VOMITING: 0
SHORTNESS OF BREATH: 0

## (undated) DEVICE — SPONGE GZ W4XL4IN COT 12 PLY TYP VII WVN C FLD DSGN

## (undated) DEVICE — 7 DAY SILVER-COATED ANTIMICROBIAL BARRIER DRESSING: Brand: ACTICOAT 7  4" X 5"

## (undated) DEVICE — GUIDEWIRE ORTH L400MM DIA3.2MM FOR TFN

## (undated) DEVICE — PAD,ABDOMINAL,5"X9",ST,LF,25/BX: Brand: MEDLINE INDUSTRIES, INC.

## (undated) DEVICE — BIT DRL L413MM DIA4.3MM FOR FEM NK SYSTEN

## (undated) DEVICE — SURGICAL PROCEDURE PACK BASIC ST FRANCIS

## (undated) DEVICE — DRAPE PT ISOLATN 130 IN X 96 IN

## (undated) DEVICE — REAMER SURG FOR FEM NK COMPLETE OPENING SYS NS